# Patient Record
Sex: FEMALE | Race: WHITE | Employment: OTHER | URBAN - METROPOLITAN AREA
[De-identification: names, ages, dates, MRNs, and addresses within clinical notes are randomized per-mention and may not be internally consistent; named-entity substitution may affect disease eponyms.]

---

## 2017-01-20 PROBLEM — R03.0 ELEVATED BP WITHOUT DIAGNOSIS OF HYPERTENSION: Status: ACTIVE | Noted: 2017-01-20

## 2017-02-03 PROCEDURE — 86038 ANTINUCLEAR ANTIBODIES: CPT | Performed by: INTERNAL MEDICINE

## 2017-02-03 PROCEDURE — 87340 HEPATITIS B SURFACE AG IA: CPT | Performed by: INTERNAL MEDICINE

## 2017-02-03 PROCEDURE — 86803 HEPATITIS C AB TEST: CPT | Performed by: INTERNAL MEDICINE

## 2017-02-03 PROCEDURE — 82103 ALPHA-1-ANTITRYPSIN TOTAL: CPT | Performed by: INTERNAL MEDICINE

## 2017-02-03 PROCEDURE — 82390 ASSAY OF CERULOPLASMIN: CPT | Performed by: INTERNAL MEDICINE

## 2017-02-03 PROCEDURE — 84432 ASSAY OF THYROGLOBULIN: CPT | Performed by: INTERNAL MEDICINE

## 2017-02-10 PROCEDURE — 88305 TISSUE EXAM BY PATHOLOGIST: CPT | Performed by: INTERNAL MEDICINE

## 2017-04-28 ENCOUNTER — HOSPITAL ENCOUNTER (OUTPATIENT)
Age: 72
Discharge: HOME | End: 2017-04-28
Attending: EMERGENCY MEDICINE
Payer: MEDICARE

## 2017-04-28 VITALS
TEMPERATURE: 98 F | WEIGHT: 180 LBS | HEIGHT: 63 IN | OXYGEN SATURATION: 97 % | SYSTOLIC BLOOD PRESSURE: 145 MMHG | HEART RATE: 79 BPM | RESPIRATION RATE: 18 BRPM | BODY MASS INDEX: 31.89 KG/M2 | DIASTOLIC BLOOD PRESSURE: 76 MMHG

## 2017-04-28 DIAGNOSIS — N30.01 ACUTE CYSTITIS WITH HEMATURIA: Primary | ICD-10-CM

## 2017-04-28 PROCEDURE — 99214 OFFICE O/P EST MOD 30 MIN: CPT

## 2017-04-28 PROCEDURE — 87086 URINE CULTURE/COLONY COUNT: CPT | Performed by: EMERGENCY MEDICINE

## 2017-04-28 PROCEDURE — 81002 URINALYSIS NONAUTO W/O SCOPE: CPT

## 2017-04-28 PROCEDURE — 87186 SC STD MICRODIL/AGAR DIL: CPT | Performed by: EMERGENCY MEDICINE

## 2017-04-28 PROCEDURE — 87088 URINE BACTERIA CULTURE: CPT | Performed by: EMERGENCY MEDICINE

## 2017-04-28 RX ORDER — SULFAMETHOXAZOLE AND TRIMETHOPRIM 800; 160 MG/1; MG/1
1 TABLET ORAL 2 TIMES DAILY
Qty: 14 TABLET | Refills: 0 | Status: SHIPPED | OUTPATIENT
Start: 2017-04-28 | End: 2017-05-05

## 2017-04-28 NOTE — ED PROVIDER NOTES
Patient Seen in: [address] [facility]    History   Patient presents with:  Urinary Symptoms (urologic)    Stated Complaint: POSSIBLE UTI, PRESSURE    HPI    Patient is an 78-year-old female with a history of GERD and osteoarthritis • Thyroid disease Neg          Smoking Status: Never Smoker                      Smokeless Status: Never Used                        Comment: caffeine 2     Alcohol Use: No                Review of Systems    Positive for stated complaint: POSSIBLE UTI, tablet  Take 1 tablet by mouth 2 (two) times daily.   Qty: 14 tablet Refills: 0

## 2017-04-28 NOTE — ED INITIAL ASSESSMENT (HPI)
Pt reporting urinary urgency and frequency starting yesterday but worse this morning. Pt reporting dysuria and mild lower back discomfort. Denies fever. No N/V/D.

## 2017-04-30 ENCOUNTER — HOSPITAL ENCOUNTER (OUTPATIENT)
Age: 72
Discharge: HOME | End: 2017-04-30
Attending: EMERGENCY MEDICINE
Payer: MEDICARE

## 2017-04-30 VITALS
HEART RATE: 78 BPM | OXYGEN SATURATION: 97 % | WEIGHT: 180 LBS | HEIGHT: 63 IN | BODY MASS INDEX: 31.89 KG/M2 | SYSTOLIC BLOOD PRESSURE: 135 MMHG | TEMPERATURE: 98 F | DIASTOLIC BLOOD PRESSURE: 68 MMHG | RESPIRATION RATE: 16 BRPM

## 2017-04-30 DIAGNOSIS — T78.40XA ALLERGIC REACTION, INITIAL ENCOUNTER: Primary | ICD-10-CM

## 2017-04-30 PROCEDURE — 99214 OFFICE O/P EST MOD 30 MIN: CPT

## 2017-04-30 PROCEDURE — 99213 OFFICE O/P EST LOW 20 MIN: CPT

## 2017-04-30 RX ORDER — NITROFURANTOIN 25; 75 MG/1; MG/1
100 CAPSULE ORAL 2 TIMES DAILY
Qty: 14 CAPSULE | Refills: 0 | Status: SHIPPED | OUTPATIENT
Start: 2017-04-30 | End: 2017-05-07

## 2017-04-30 NOTE — ED PROVIDER NOTES
Patient Seen in: [facility]    History   Patient presents with:   Allergic Rxn Allergies (immune)    Stated Complaint: Rxn to Bactrim; Rash    HPI    Patient is a 52-year-old female with past history of gastroesophageal re Dawit Munira Mother    • Cancer Maternal Grandmother      colon cancer   • Other [OTHER] Other      muscular dystrophy 2  genetic   • Breast Cancer Neg    • Thyroid disease Neg          Smoking Status: Never Smoker                      Smokeless St Clinical Impression:  Allergic reaction, initial encounter  (primary encounter diagnosis)    Disposition:  Discharge    Follow-up:  Crystal Rodríguez MD  [address] [facility]  [phone]    In 3 days  If symptoms worsen      Medications Presc

## 2017-04-30 NOTE — ED INITIAL ASSESSMENT (HPI)
Rash to chin back and arms this am, was on bactrim for uti started 2 days, denies tongue swelling, no sob, uti symptoms better

## 2017-11-30 ENCOUNTER — HOSPITAL ENCOUNTER (OUTPATIENT)
Age: 72
Discharge: HOME | End: 2017-11-30
Attending: PEDIATRICS
Payer: MEDICARE

## 2017-11-30 VITALS
TEMPERATURE: 98 F | DIASTOLIC BLOOD PRESSURE: 66 MMHG | RESPIRATION RATE: 20 BRPM | HEIGHT: 63 IN | WEIGHT: 180 LBS | SYSTOLIC BLOOD PRESSURE: 150 MMHG | OXYGEN SATURATION: 98 % | HEART RATE: 77 BPM | BODY MASS INDEX: 31.89 KG/M2

## 2017-11-30 DIAGNOSIS — J01.90 ACUTE SINUSITIS, RECURRENCE NOT SPECIFIED, UNSPECIFIED LOCATION: Primary | ICD-10-CM

## 2017-11-30 PROCEDURE — 99213 OFFICE O/P EST LOW 20 MIN: CPT

## 2017-11-30 PROCEDURE — 99214 OFFICE O/P EST MOD 30 MIN: CPT

## 2017-11-30 RX ORDER — DOXYCYCLINE HYCLATE 100 MG/1
100 CAPSULE ORAL 2 TIMES DAILY
Qty: 20 CAPSULE | Refills: 0 | Status: SHIPPED | OUTPATIENT
Start: 2017-11-30 | End: 2017-12-10

## 2017-11-30 NOTE — ED PROVIDER NOTES
Patient presents with:  Cough/URI      HPI:     Carlota Davis is a 71year old female who presents for evaluation of a chief complaint of upper respiratory symptoms for about 8-9 days.   Patient states she was at a party with a bunch of kids and thinks she today.    Follow Up with:  Jerman Austin MD  [address]  [phone]      As needed

## 2017-11-30 NOTE — ED INITIAL ASSESSMENT (HPI)
PATIENT REPORTS 1 WEEK HISTORY OF SINUS PAIN AND PRESSURE. STATES SHE THINKS SHE HAS A SINUS INFECTION. DENIES COUGH. DENIES FEVERS.

## 2018-01-22 PROBLEM — K75.81 NASH (NONALCOHOLIC STEATOHEPATITIS): Status: ACTIVE | Noted: 2018-01-22

## 2018-02-23 PROBLEM — I71.20 THORACIC AORTIC ANEURYSM WITHOUT RUPTURE (HCC): Status: ACTIVE | Noted: 2018-02-23

## 2018-02-23 PROBLEM — I71.2 THORACIC AORTIC ANEURYSM WITHOUT RUPTURE (HCC): Status: ACTIVE | Noted: 2018-02-23

## 2018-02-23 PROBLEM — I71.20 THORACIC AORTIC ANEURYSM WITHOUT RUPTURE: Status: ACTIVE | Noted: 2018-02-23

## 2018-06-11 ENCOUNTER — HOSPITAL ENCOUNTER (OUTPATIENT)
Age: 73
Discharge: HOME | End: 2018-06-11
Attending: EMERGENCY MEDICINE
Payer: MEDICARE

## 2018-06-11 VITALS
HEART RATE: 71 BPM | HEIGHT: 63 IN | DIASTOLIC BLOOD PRESSURE: 73 MMHG | OXYGEN SATURATION: 97 % | WEIGHT: 185 LBS | SYSTOLIC BLOOD PRESSURE: 147 MMHG | TEMPERATURE: 99 F | RESPIRATION RATE: 20 BRPM | BODY MASS INDEX: 32.78 KG/M2

## 2018-06-11 DIAGNOSIS — J01.41 ACUTE RECURRENT PANSINUSITIS: Primary | ICD-10-CM

## 2018-06-11 PROCEDURE — 99214 OFFICE O/P EST MOD 30 MIN: CPT

## 2018-06-11 PROCEDURE — 99213 OFFICE O/P EST LOW 20 MIN: CPT

## 2018-06-11 RX ORDER — DOXYCYCLINE HYCLATE 100 MG/1
100 CAPSULE ORAL 2 TIMES DAILY
Qty: 20 CAPSULE | Refills: 0 | Status: SHIPPED | OUTPATIENT
Start: 2018-06-11 | End: 2018-06-21

## 2018-06-11 NOTE — ED PROVIDER NOTES
Patient Seen in: 5 [facility]    History   Patient presents with:  Cough/URI    Stated Complaint: SINUS    HPI    The patient is a 67-year-old female with past history of GERD, sinusitis in the past, most recently 11/2017 w Vitals [06/11/18 1049]  BP: 147/73  Pulse: 71  Resp: 20  Temp: 98.5 °F (36.9 °C)  Temp src: Oral  SpO2: 97 %  O2 Device: n/a    Current:/73   Pulse 71   Temp 98.5 °F (36.9 °C) (Oral)   Resp 20   Ht 160 cm (5' 3\")   Wt 83.9 kg   SpO2 97%   BMI 32.77

## 2019-02-17 ENCOUNTER — HOSPITAL ENCOUNTER (OUTPATIENT)
Age: 74
Discharge: HOME | End: 2019-02-17
Attending: EMERGENCY MEDICINE
Payer: MEDICARE

## 2019-02-17 ENCOUNTER — APPOINTMENT (OUTPATIENT)
Dept: GENERAL RADIOLOGY | Age: 74
End: 2019-02-17
Attending: EMERGENCY MEDICINE
Payer: MEDICARE

## 2019-02-17 VITALS
TEMPERATURE: 98 F | SYSTOLIC BLOOD PRESSURE: 153 MMHG | OXYGEN SATURATION: 99 % | HEIGHT: 63 IN | DIASTOLIC BLOOD PRESSURE: 65 MMHG | RESPIRATION RATE: 16 BRPM | HEART RATE: 71 BPM | BODY MASS INDEX: 32.77 KG/M2 | WEIGHT: 184.94 LBS

## 2019-02-17 DIAGNOSIS — M79.601 PAIN OF RIGHT UPPER EXTREMITY: Primary | ICD-10-CM

## 2019-02-17 PROCEDURE — 72072 X-RAY EXAM THORAC SPINE 3VWS: CPT | Performed by: EMERGENCY MEDICINE

## 2019-02-17 PROCEDURE — 99214 OFFICE O/P EST MOD 30 MIN: CPT

## 2019-02-17 PROCEDURE — 73060 X-RAY EXAM OF HUMERUS: CPT | Performed by: EMERGENCY MEDICINE

## 2019-02-17 RX ORDER — ACETAMINOPHEN 325 MG/1
650 TABLET ORAL ONCE
Status: COMPLETED | OUTPATIENT
Start: 2019-02-17 | End: 2019-02-17

## 2019-02-17 NOTE — ED PROVIDER NOTES
Patient Seen in: 5 [facility]    History   Patient presents with:  Fall (musculoskeletal, neurologic)    Stated Complaint: Fall, upper arm pain    HPI    Patient states she fell today falling against her right upper arm on 153/65   Pulse 71   Resp 16   Temp 97.7 °F (36.5 °C)   Temp src Oral   SpO2 99 %   O2 Device None (Room air)       Current:/65   Pulse 71   Temp 97.7 °F (36.5 °C) (Oral)   Resp 16   Ht 160 cm (5' 3\")   Wt 83.9 kg   SpO2 99%   BMI 32.77 kg/m² over the right upper quadrant, likely from prior cholecystectomy. CONCLUSION:  1. No radiographically visible acute osseous injury of the thoracic spine. 2. Mild scoliosis and multilevel degenerative changes thoracic spine.      Dictated by (CST):

## 2019-02-17 NOTE — ED INITIAL ASSESSMENT (HPI)
PATIENT REPORTS FALL 1 HOUR PRIOR TO ARRIVAL. STATES SHE SLIPPED ON ICE AND FELL ONTO HER RIGHT SIDE AND IS NOW C/O RIGHT UPPER ARM PAIN. DENIES PAIN TO RIGHT SHOULDER RIGHT, FOREARM, WRIST AND/OR RIGHT LOWER EXTREMITY.

## 2019-03-12 PROCEDURE — 88344 IMHCHEM/IMCYTCHM EA MLT ANTB: CPT | Performed by: RADIOLOGY

## 2019-03-12 PROCEDURE — 88360 TUMOR IMMUNOHISTOCHEM/MANUAL: CPT | Performed by: RADIOLOGY

## 2019-03-12 PROCEDURE — 88342 IMHCHEM/IMCYTCHM 1ST ANTB: CPT | Performed by: RADIOLOGY

## 2019-03-12 PROCEDURE — 88305 TISSUE EXAM BY PATHOLOGIST: CPT | Performed by: RADIOLOGY

## 2019-03-18 PROBLEM — C50.412 MALIGNANT NEOPLASM OF UPPER-OUTER QUADRANT OF LEFT BREAST IN FEMALE, ESTROGEN RECEPTOR POSITIVE  (HCC): Status: ACTIVE | Noted: 2019-03-18

## 2019-03-18 PROBLEM — C50.412 MALIGNANT NEOPLASM OF UPPER-OUTER QUADRANT OF LEFT BREAST IN FEMALE, ESTROGEN RECEPTOR POSITIVE (HCC): Status: ACTIVE | Noted: 2019-03-18

## 2019-03-18 PROBLEM — C50.412 MALIGNANT NEOPLASM OF UPPER-OUTER QUADRANT OF LEFT BREAST IN FEMALE, ESTROGEN RECEPTOR POSITIVE: Status: ACTIVE | Noted: 2019-03-18

## 2019-03-18 PROBLEM — Z17.0 MALIGNANT NEOPLASM OF UPPER-OUTER QUADRANT OF LEFT BREAST IN FEMALE, ESTROGEN RECEPTOR POSITIVE (HCC): Status: ACTIVE | Noted: 2019-03-18

## 2019-03-18 PROBLEM — Z17.0 MALIGNANT NEOPLASM OF UPPER-OUTER QUADRANT OF LEFT BREAST IN FEMALE, ESTROGEN RECEPTOR POSITIVE  (HCC): Status: ACTIVE | Noted: 2019-03-18

## 2019-03-18 PROBLEM — Z17.0 MALIGNANT NEOPLASM OF UPPER-OUTER QUADRANT OF LEFT BREAST IN FEMALE, ESTROGEN RECEPTOR POSITIVE: Status: ACTIVE | Noted: 2019-03-18

## 2019-03-29 PROCEDURE — 88360 TUMOR IMMUNOHISTOCHEM/MANUAL: CPT | Performed by: RADIOLOGY

## 2019-03-29 PROCEDURE — 88342 IMHCHEM/IMCYTCHM 1ST ANTB: CPT | Performed by: RADIOLOGY

## 2019-03-29 PROCEDURE — 88377 M/PHMTRC ALYS ISHQUANT/SEMIQ: CPT | Performed by: RADIOLOGY

## 2019-03-29 PROCEDURE — 88305 TISSUE EXAM BY PATHOLOGIST: CPT | Performed by: RADIOLOGY

## 2019-03-29 PROCEDURE — 88341 IMHCHEM/IMCYTCHM EA ADD ANTB: CPT | Performed by: RADIOLOGY

## 2019-04-01 PROBLEM — C50.912 INFLAMMATORY BREAST CANCER, LEFT (HCC): Status: ACTIVE | Noted: 2019-04-01

## 2019-04-11 PROCEDURE — 88305 TISSUE EXAM BY PATHOLOGIST: CPT | Performed by: RADIOLOGY

## 2019-05-31 PROBLEM — Z17.31 HER2-POSITIVE CARCINOMA OF BREAST (HCC): Status: ACTIVE | Noted: 2019-05-31

## 2019-05-31 PROBLEM — C50.919 HER2-POSITIVE CARCINOMA OF BREAST (HCC): Status: ACTIVE | Noted: 2019-05-31

## 2019-06-26 ENCOUNTER — HOSPITAL ENCOUNTER (INPATIENT)
Facility: HOSPITAL | Age: 74
LOS: 16 days | Discharge: NURSING, REHAB OR LONG-TERM CARE | DRG: 871 | End: 2019-07-12
Attending: EMERGENCY MEDICINE | Admitting: HOSPITALIST
Payer: MEDICARE

## 2019-06-26 ENCOUNTER — APPOINTMENT (OUTPATIENT)
Dept: GENERAL RADIOLOGY | Facility: HOSPITAL | Age: 74
DRG: 871 | End: 2019-06-26
Attending: EMERGENCY MEDICINE
Payer: MEDICARE

## 2019-06-26 DIAGNOSIS — A41.9 SEPSIS, DUE TO UNSPECIFIED ORGANISM: Primary | ICD-10-CM

## 2019-06-26 DIAGNOSIS — D70.9 NEUTROPENIC FEVER (HCC): ICD-10-CM

## 2019-06-26 DIAGNOSIS — R50.81 NEUTROPENIC FEVER (HCC): ICD-10-CM

## 2019-06-26 DIAGNOSIS — J18.9 PNEUMONIA OF LEFT LOWER LOBE DUE TO INFECTIOUS ORGANISM: ICD-10-CM

## 2019-06-26 PROBLEM — R50.9 FEVER: Status: ACTIVE | Noted: 2019-06-26

## 2019-06-26 LAB
BILIRUB UR QL: NEGATIVE
CLARITY UR: CLEAR
COLOR UR: YELLOW
GLUCOSE UR-MCNC: NEGATIVE MG/DL
KETONES UR-MCNC: NEGATIVE MG/DL
LACTATE SERPL-SCNC: 2 MMOL/L (ref 0.4–2)
LEUKOCYTE ESTERASE UR QL STRIP.AUTO: NEGATIVE
NITRITE UR QL STRIP.AUTO: NEGATIVE
PH UR: 6 [PH] (ref 5–8)
PROT UR-MCNC: NEGATIVE MG/DL
RBC #/AREA URNS AUTO: 2 /HPF
SP GR UR STRIP: 1.01 (ref 1–1.03)
UROBILINOGEN UR STRIP-ACNC: <2
VIT C UR-MCNC: NEGATIVE MG/DL
WBC #/AREA URNS AUTO: 1 /HPF

## 2019-06-26 PROCEDURE — 71046 X-RAY EXAM CHEST 2 VIEWS: CPT | Performed by: EMERGENCY MEDICINE

## 2019-06-26 RX ORDER — SODIUM CHLORIDE 0.9 % (FLUSH) 0.9 %
3 SYRINGE (ML) INJECTION AS NEEDED
Status: DISCONTINUED | OUTPATIENT
Start: 2019-06-26 | End: 2019-07-12

## 2019-06-26 RX ORDER — ONDANSETRON 2 MG/ML
4 INJECTION INTRAMUSCULAR; INTRAVENOUS EVERY 6 HOURS PRN
Status: DISCONTINUED | OUTPATIENT
Start: 2019-06-26 | End: 2019-07-12

## 2019-06-26 RX ORDER — ACETAMINOPHEN 500 MG
1000 TABLET ORAL ONCE
Status: COMPLETED | OUTPATIENT
Start: 2019-06-26 | End: 2019-06-26

## 2019-06-26 RX ORDER — METOCLOPRAMIDE HYDROCHLORIDE 5 MG/ML
10 INJECTION INTRAMUSCULAR; INTRAVENOUS EVERY 8 HOURS PRN
Status: DISCONTINUED | OUTPATIENT
Start: 2019-06-26 | End: 2019-07-02

## 2019-06-26 RX ORDER — PANTOPRAZOLE SODIUM 40 MG/1
40 TABLET, DELAYED RELEASE ORAL
Status: DISCONTINUED | OUTPATIENT
Start: 2019-06-27 | End: 2019-07-12

## 2019-06-26 RX ORDER — METOPROLOL SUCCINATE 25 MG/1
25 TABLET, EXTENDED RELEASE ORAL NIGHTLY
Status: DISCONTINUED | OUTPATIENT
Start: 2019-06-26 | End: 2019-07-12

## 2019-06-26 RX ORDER — SODIUM CHLORIDE 9 MG/ML
INJECTION, SOLUTION INTRAVENOUS CONTINUOUS
Status: DISCONTINUED | OUTPATIENT
Start: 2019-06-26 | End: 2019-06-28

## 2019-06-26 RX ORDER — ENOXAPARIN SODIUM 100 MG/ML
40 INJECTION SUBCUTANEOUS DAILY
Status: DISCONTINUED | OUTPATIENT
Start: 2019-06-27 | End: 2019-07-01

## 2019-06-26 RX ORDER — FLUTICASONE PROPIONATE 50 MCG
1 SPRAY, SUSPENSION (ML) NASAL DAILY
Status: DISCONTINUED | OUTPATIENT
Start: 2019-06-27 | End: 2019-07-05

## 2019-06-26 RX ORDER — ACETAMINOPHEN 325 MG/1
650 TABLET ORAL EVERY 6 HOURS PRN
Status: DISCONTINUED | OUTPATIENT
Start: 2019-06-26 | End: 2019-07-12

## 2019-06-27 ENCOUNTER — APPOINTMENT (OUTPATIENT)
Dept: GENERAL RADIOLOGY | Facility: HOSPITAL | Age: 74
DRG: 871 | End: 2019-06-27
Attending: INTERNAL MEDICINE
Payer: MEDICARE

## 2019-06-27 LAB
ADENOVIRUS F 40/41 PCR: NEGATIVE
ADENOVIRUS PCR:: NEGATIVE
ALBUMIN SERPL-MCNC: 2.8 G/DL (ref 3.4–5)
ALP LIVER SERPL-CCNC: 48 U/L (ref 55–142)
ALT SERPL-CCNC: 46 U/L (ref 13–56)
ANION GAP SERPL CALC-SCNC: 13 MMOL/L (ref 0–18)
AST SERPL-CCNC: 61 U/L (ref 15–37)
ASTROVIRUS PCR: NEGATIVE
B PERT DNA SPEC QL NAA+PROBE: NEGATIVE
BASOPHILS # BLD: 0.03 X10(3) UL (ref 0–0.2)
BASOPHILS NFR BLD: 2 %
BILIRUB DIRECT SERPL-MCNC: 0.2 MG/DL (ref 0–0.2)
BILIRUB SERPL-MCNC: 0.7 MG/DL (ref 0.1–2)
BUN BLD-MCNC: 9 MG/DL (ref 7–18)
BUN/CREAT SERPL: 10.7 (ref 10–20)
C CAYETANENSIS DNA SPEC QL NAA+PROBE: NEGATIVE
C PNEUM DNA SPEC QL NAA+PROBE: NEGATIVE
C. DIFFICILE TOXIN A/B PCR: POSITIVE
CALCIUM BLD-MCNC: 8.6 MG/DL (ref 8.5–10.1)
CAMPY SP DNA.DIARRHEA STL QL NAA+PROBE: NEGATIVE
CHLORIDE SERPL-SCNC: 107 MMOL/L (ref 98–112)
CO2 SERPL-SCNC: 23 MMOL/L (ref 21–32)
CORONAVIRUS 229E PCR:: NEGATIVE
CORONAVIRUS HKU1 PCR:: NEGATIVE
CORONAVIRUS NL63 PCR:: NEGATIVE
CORONAVIRUS OC43 PCR:: NEGATIVE
CREAT BLD-MCNC: 0.84 MG/DL (ref 0.55–1.02)
CRYPTOSP DNA SPEC QL NAA+PROBE: NEGATIVE
DEPRECATED RDW RBC AUTO: 51.9 FL (ref 35.1–46.3)
EAEC PAA PLAS AGGR+AATA ST NAA+NON-PRB: NEGATIVE
EC STX1+STX2 + H7 FLIC SPEC NAA+PROBE: NEGATIVE
ENTAMOEBA HISTOLYTICA PCR: NEGATIVE
EOSINOPHIL # BLD: 0.02 X10(3) UL (ref 0–0.7)
EOSINOPHIL NFR BLD: 1 %
EPEC EAE GENE STL QL NAA+NON-PROBE: NEGATIVE
ERYTHROCYTE [DISTWIDTH] IN BLOOD BY AUTOMATED COUNT: 15.9 % (ref 11–15)
ETEC LTA+ST1A+ST1B TOX ST NAA+NON-PROBE: NEGATIVE
FLUAV RNA SPEC QL NAA+PROBE: NEGATIVE
FLUBV RNA SPEC QL NAA+PROBE: NEGATIVE
GIARDIA LAMBLIA PCR: NEGATIVE
GLUCOSE BLD-MCNC: 138 MG/DL (ref 70–99)
HCT VFR BLD AUTO: 29.7 % (ref 35–48)
HGB BLD-MCNC: 10.2 G/DL (ref 12–16)
LACTATE SERPL-SCNC: 1.1 MMOL/L (ref 0.4–2)
LACTATE SERPL-SCNC: 4.9 MMOL/L (ref 0.4–2)
LYMPHOCYTES NFR BLD: 0.54 X10(3) UL (ref 1–4)
LYMPHOCYTES NFR BLD: 32 %
M PROTEIN MFR SERPL ELPH: 5.6 G/DL (ref 6.4–8.2)
MCH RBC QN AUTO: 30.6 PG (ref 26–34)
MCHC RBC AUTO-ENTMCNC: 34.3 G/DL (ref 31–37)
MCV RBC AUTO: 89.2 FL (ref 80–100)
METAPNEUMOVIRUS PCR:: NEGATIVE
MONOCYTES # BLD: 0.51 X10(3) UL (ref 0.1–1)
MONOCYTES NFR BLD: 30 %
MORPHOLOGY: NORMAL
MYCOPLASMA PNEUMONIA PCR:: NEGATIVE
NEUTROPHILS # BLD AUTO: 0.53 X10 (3) UL (ref 1.5–7.7)
NEUTROPHILS NFR BLD: 32 %
NEUTS BAND NFR BLD: 3 %
NEUTS HYPERSEG # BLD: 0.6 X10(3) UL (ref 1.5–7.7)
NOROVIRUS GI/GII PCR: NEGATIVE
OSMOLALITY SERPL CALC.SUM OF ELEC: 297 MOSM/KG (ref 275–295)
P SHIGELLOIDES DNA STL QL NAA+PROBE: NEGATIVE
PARAINFLUENZA 1 PCR:: NEGATIVE
PARAINFLUENZA 2 PCR:: NEGATIVE
PARAINFLUENZA 3 PCR:: NEGATIVE
PARAINFLUENZA 4 PCR:: NEGATIVE
PLATELET # BLD AUTO: 286 10(3)UL (ref 150–450)
PLATELET MORPHOLOGY: NORMAL
POTASSIUM SERPL-SCNC: 3.3 MMOL/L (ref 3.5–5.1)
RBC # BLD AUTO: 3.33 X10(6)UL (ref 3.8–5.3)
RHINOVIRUS/ENTERO PCR:: NEGATIVE
ROTAVIRUS A PCR: NEGATIVE
RSV RNA SPEC QL NAA+PROBE: NEGATIVE
SALMONELLA DNA SPEC QL NAA+PROBE: NEGATIVE
SAPOVIRUS PCR: NEGATIVE
SHIGELLA SP+EIEC IPAH ST NAA+NON-PROBE: NEGATIVE
SODIUM SERPL-SCNC: 143 MMOL/L (ref 136–145)
TOTAL CELLS COUNTED: 100
V CHOLERAE DNA SPEC QL NAA+PROBE: NEGATIVE
VIBRIO DNA SPEC NAA+PROBE: NEGATIVE
WBC # BLD AUTO: 1.7 X10(3) UL (ref 4–11)
YERSINIA DNA SPEC NAA+PROBE: NEGATIVE

## 2019-06-27 PROCEDURE — 71045 X-RAY EXAM CHEST 1 VIEW: CPT | Performed by: INTERNAL MEDICINE

## 2019-06-27 RX ORDER — LORAZEPAM 0.5 MG/1
0.5 TABLET ORAL NIGHTLY PRN
Status: DISCONTINUED | OUTPATIENT
Start: 2019-06-27 | End: 2019-06-28

## 2019-06-27 RX ORDER — POTASSIUM CHLORIDE 29.8 MG/ML
40 INJECTION INTRAVENOUS ONCE
Status: COMPLETED | OUTPATIENT
Start: 2019-06-27 | End: 2019-06-27

## 2019-06-27 RX ORDER — IPRATROPIUM BROMIDE AND ALBUTEROL SULFATE 2.5; .5 MG/3ML; MG/3ML
3 SOLUTION RESPIRATORY (INHALATION) EVERY 4 HOURS PRN
Status: DISCONTINUED | OUTPATIENT
Start: 2019-06-27 | End: 2019-07-12

## 2019-06-27 NOTE — PLAN OF CARE
Problem: Patient Centered Care  Goal: Patient preferences are identified and integrated in the patient's plan of care  Description  Interventions:  - What would you like us to know as we care for you? I have periods of incontinence.    - Provide timely, c injury  Description  INTERVENTIONS:  - Assess pt frequently for physical needs  - Identify cognitive and physical deficits and behaviors that affect risk of falls.   - [place] fall precautions as indicated by assessment.  - Educate pt/family on patient sa

## 2019-06-27 NOTE — PLAN OF CARE
Problem: Patient Centered Care  Goal: Patient preferences are identified and integrated in the patient's plan of care  Description  Interventions:  - What would you like us to know as we care for you?   - Provide timely, complete, and accurate informatio strengthening/mobility  - Encourage toileting schedule  Outcome: Progressing

## 2019-06-27 NOTE — CONSULTS
Hematology/Oncology Consult Note        NAME: Nesha Perez - [facility]T - MRN: [id number] - Age: 68year old - : 1945    Reason for Consult:  Breast Cancer, Neutropenic Fever, pneumonia     Ryne Hao is a 68 y.o female well known to me for the m genetic   • Heart Disease Sister 72   • Colon Cancer Maternal Grandmother         colon cancer   • Cancer Maternal Grandmother         colon   • Breast Cancer Neg    • Thyroid disease Neg        SOCIAL HISTORY: Social History    Tobacco Use      Smoking st --  8.6   ALB 2.9* 2.8*   * 143   K 3.00* 3.3*   CL 95* 107   CO2 25.5 23.0   ALKPHO 51* 48*   AST 62* 61*   ALT 42 46   BILT 0.77 0.7   TP 6.2 5.6*       Imaging:   Reviewed in chart     Assessment/Plan:  Twyla Montoya is a 68 y.o female with anshul

## 2019-06-27 NOTE — PROGRESS NOTES
[facility] Dosing Service    Initial Pharmacokinetic Consult for Vancomycin Dosing     Twyla Montoya is a 68year old female who is being treated for pneumonia.   Pharmacy has been asked to dose Vancomycin by Dr. Dumont Child    She is allergic to adhesive tape

## 2019-06-27 NOTE — PROGRESS NOTES
[facility]      Sepsis Reassessment Note    /76 (BP Location: Left arm)   Pulse 103   Temp 100.4 °F (38 °C) (Temporal)   Resp 26   Ht 5' 3\" (1.6 m)   Wt 185 lb 11.2 oz (84.2 kg)   SpO2 100%   BMI 32.90 kg/m²      8:29 AM    Cardiac

## 2019-06-27 NOTE — PROGRESS NOTES
[facility] Note:  Renal Dose Adjustment    Cefepime (Maxipime) dosing had been previously adjusted by pharmacy due to renal insufficiency for Lachelle Gonzalez. Estimated Creatinine Clearance: 50.1 mL/min (based on SCr of 0.84 mg/dL).     Due to improved

## 2019-06-27 NOTE — ED PROVIDER NOTES
Patient Seen in: [facility] Emergency Department    History   Patient presents with:  Fever    Stated Complaint: Neutropenic fever, r/o PNA from office    HPI    80-year-old female with history of Breast cancer, last chemotherapy 9 days ago, here No      Alcohol/week: 0.0 oz    Drug use: No      Review of Systems   Constitutional: Positive for fever. Negative for appetite change and fatigue. HENT: Negative for congestion, ear pain and sore throat.     Eyes: Negative for pain, discharge and redness She exhibits no distension. There is no tenderness. There is no guarding. Musculoskeletal: Normal range of motion. She exhibits no tenderness. Neurological: She is alert and oriented to person, place, and time.    5/5 strength in b/l UEs and LEs, normal Neutrophils Absolute Manual 0.49 (LL) 1.30 - 6.70 10ˆ3/µL    Lymphocytes Absolute Manual 0.33 (L) 0.90 - 4.00 10ˆ3/µL    Monocytes Absolute Manual 0.59 0.10 - 0.60 10ˆ3/µL    Eosinophils Absolute 0.00 0.00 - 0.30 10ˆ3/µL    Basophils Absolute Manual 0.03 0 found neutropenic, anemia, hypokalemia, XR with pneumonia, lactic normal  - fluids ordered  - tylenol ordered  - k repleted  - vanc/cefepime ordered  - discussed with Dr. Bettina Chino - informed him of pt admission  - discussed with Dr. Esme Sandoval - informed her pleural effusion, neutropenia.     Critical Care Time:  Total critical care time for this patient was 45 minutes exclusive of separately billable procedures, but in obtaining history, performing a physical exam, bedside monitoring of interventions, collecti

## 2019-06-27 NOTE — H&P
[facility] Hospitalist H&P       CC: Patient presents with:  Fever       PCP: Miladys Mortensen MD    History of Present Illness: Patient is a 68year old female with PMH sig for HTN, GERD, and Breast Cancer HER2+ on neoadjuvant chemo, last dose 10 days ago, who p RASH     Home Medications:    Outpatient Medications Marked as Taking for the 6/26/19 encounter [facility] H[facility] Encounter):  diphenoxylate-atropine 2.5-0.025 MG Oral Tab Take 1-2 tablets by mouth 4 (four) times daily as needed for Diarrhea.  Disp: 30 tablet Rfl: Normocephalic    Eyes:  Sclera anicteric, No conjunctival pallor    Nose: Nares normal. Septum midline    Throat: Lips, mucosa, and tongue normal    Neck: Supple,    Lungs:   Rhonchi, wheezing    Chest wall:  No tenderness or deformity.    Heart:  Tachy, no fever from PNA  - treatment as noted above  - filgastrim given this am  - onc following    Acute hypoxic respiratory failure  - 2/2 to PNA  - on NRB  - wean O2 as able  - pulm consulted  - RVP ordered    Cdiff / diarrhea  - cdiff +  - oral vanco    Hypokal

## 2019-06-27 NOTE — SIGNIFICANT EVENT
RRT    *See RRT Documentation Record*    Reason the RRT was called: O2 in 60s, rigors  Assessment of patient leading up to RRT: pt walked to bathroom and said she had been having shakes for about 10 minutes. HR in 140s while walking back to bed.  Took vital

## 2019-06-27 NOTE — PLAN OF CARE
Problem: Patient Centered Care  Goal: Patient preferences are identified and integrated in the patient's plan of care  Description  Interventions:  - What would you like us to know as we care for you?   - Provide timely, complete, and accurate informatio Progressing    Pt resting in bed. Ambulates in room with assist. 2L O2 in place on admission, increased to 3L. Nonproductive cough. SOB with activity. IV fluid started. Remote tele in place. IV antibiotics and IV potassium given in ED.  Pt denies pain or na

## 2019-06-27 NOTE — CONSULTS
Critical Care Consult     Assessment / Plan:  1. Acute hypoxic respiratory failure - due to PNA  - on NRB  - vapotherm if she will tolerate  - abx as below  2. Severe sepsis  - IVFs  - lactic acid normalized  - abx  3.  HCAP  - vanc and cefepime  - follow-u 2016   •       1 no dm   • OTHER SURGICAL HISTORY      urethra polyp    • OTHER SURGICAL HISTORY      colposcopy ascus 2006 fibroids dx   • TONSILLECTOMY           Medications Prior to Admission:  diphenoxylate-atropine 2.5-0.025 MG Oral Tab Take 1 (eight) hours as needed for Nausea. Disp: 20 tablet Rfl: 3   LORazepam 0.5 MG Oral Tab Take 1 tablet (0.5 mg total) by mouth nightly as needed for Anxiety.  Disp: 10 tablet Rfl: 1   Pantoprazole Sodium 40 MG Oral Tab EC Take 1 tablet (40 mg total) by mouth

## 2019-06-27 NOTE — PROGRESS NOTES
Therapeutic interchange from Neupogen 300 mcg Sq once to Zarxio 300 mcg SQ once per P&T approved protocol.      Thank you,  Sharath Alberto, PharmD

## 2019-06-28 ENCOUNTER — APPOINTMENT (OUTPATIENT)
Dept: GENERAL RADIOLOGY | Facility: HOSPITAL | Age: 74
DRG: 871 | End: 2019-06-28
Attending: HOSPITALIST
Payer: MEDICARE

## 2019-06-28 LAB
ALBUMIN SERPL-MCNC: 2.5 G/DL (ref 3.4–5)
ALBUMIN/GLOB SERPL: 0.9 {RATIO} (ref 1–2)
ALP LIVER SERPL-CCNC: 43 U/L (ref 55–142)
ALT SERPL-CCNC: 42 U/L (ref 13–56)
ANION GAP SERPL CALC-SCNC: 9 MMOL/L (ref 0–18)
AST SERPL-CCNC: 70 U/L (ref 15–37)
BASOPHILS # BLD: 0 X10(3) UL (ref 0–0.2)
BASOPHILS NFR BLD: 0 %
BILIRUB SERPL-MCNC: 0.8 MG/DL (ref 0.1–2)
BUN BLD-MCNC: 6 MG/DL (ref 7–18)
BUN/CREAT SERPL: 10.3 (ref 10–20)
CALCIUM BLD-MCNC: 8.3 MG/DL (ref 8.5–10.1)
CHLORIDE SERPL-SCNC: 101 MMOL/L (ref 98–112)
CO2 SERPL-SCNC: 25 MMOL/L (ref 21–32)
CREAT BLD-MCNC: 0.58 MG/DL (ref 0.55–1.02)
DEPRECATED RDW RBC AUTO: 50.8 FL (ref 35.1–46.3)
EOSINOPHIL # BLD: 0 X10(3) UL (ref 0–0.7)
EOSINOPHIL NFR BLD: 0 %
ERYTHROCYTE [DISTWIDTH] IN BLOOD BY AUTOMATED COUNT: 15.9 % (ref 11–15)
GLOBULIN PLAS-MCNC: 2.7 G/DL (ref 2.8–4.4)
GLUCOSE BLD-MCNC: 84 MG/DL (ref 70–99)
HAV IGM SER QL: 1.2 MG/DL (ref 1.6–2.6)
HAV IGM SER QL: 2 MG/DL (ref 1.6–2.6)
HCT VFR BLD AUTO: 27.4 % (ref 35–48)
HGB BLD-MCNC: 9.5 G/DL (ref 12–16)
LYMPHOCYTES NFR BLD: 0.38 X10(3) UL (ref 1–4)
LYMPHOCYTES NFR BLD: 6 %
M PROTEIN MFR SERPL ELPH: 5.2 G/DL (ref 6.4–8.2)
MCH RBC QN AUTO: 30.4 PG (ref 26–34)
MCHC RBC AUTO-ENTMCNC: 34.7 G/DL (ref 31–37)
MCV RBC AUTO: 87.5 FL (ref 80–100)
METAMYELOCYTES # BLD: 0.13 X10(3) UL
METAMYELOCYTES NFR BLD: 2 %
MONOCYTES # BLD: 0.7 X10(3) UL (ref 0.1–1)
MONOCYTES NFR BLD: 11 %
MORPHOLOGY: NORMAL
NEUTROPHILS # BLD AUTO: 4.75 X10 (3) UL (ref 1.5–7.7)
NEUTROPHILS NFR BLD: 51 %
NEUTS BAND NFR BLD: 30 %
NEUTS HYPERSEG # BLD: 5.18 X10(3) UL (ref 1.5–7.7)
OSMOLALITY SERPL CALC.SUM OF ELEC: 277 MOSM/KG (ref 275–295)
PLATELET # BLD AUTO: 275 10(3)UL (ref 150–450)
PLATELET MORPHOLOGY: NORMAL
POTASSIUM SERPL-SCNC: 2.4 MMOL/L (ref 3.5–5.1)
POTASSIUM SERPL-SCNC: 2.7 MMOL/L (ref 3.5–5.1)
RBC # BLD AUTO: 3.13 X10(6)UL (ref 3.8–5.3)
SODIUM SERPL-SCNC: 135 MMOL/L (ref 136–145)
TOTAL CELLS COUNTED: 100
TOXIC GRANULES BLD QL SMEAR: PRESENT
WBC # BLD AUTO: 6.4 X10(3) UL (ref 4–11)

## 2019-06-28 PROCEDURE — 5A09357 ASSISTANCE WITH RESPIRATORY VENTILATION, LESS THAN 24 CONSECUTIVE HOURS, CONTINUOUS POSITIVE AIRWAY PRESSURE: ICD-10-PCS | Performed by: HOSPITALIST

## 2019-06-28 PROCEDURE — 71045 X-RAY EXAM CHEST 1 VIEW: CPT | Performed by: HOSPITALIST

## 2019-06-28 RX ORDER — POTASSIUM CHLORIDE 29.8 MG/ML
40 INJECTION INTRAVENOUS ONCE
Status: COMPLETED | OUTPATIENT
Start: 2019-06-28 | End: 2019-06-28

## 2019-06-28 RX ORDER — POTASSIUM CHLORIDE 29.8 MG/ML
40 INJECTION INTRAVENOUS ONCE
Status: COMPLETED | OUTPATIENT
Start: 2019-06-28 | End: 2019-06-29

## 2019-06-28 RX ORDER — POTASSIUM CHLORIDE 14.9 MG/ML
20 INJECTION INTRAVENOUS ONCE
Status: COMPLETED | OUTPATIENT
Start: 2019-06-28 | End: 2019-06-28

## 2019-06-28 RX ORDER — POTASSIUM CHLORIDE 29.8 MG/ML
INJECTION INTRAVENOUS
Status: COMPLETED
Start: 2019-06-28 | End: 2019-06-28

## 2019-06-28 RX ORDER — LORAZEPAM 0.5 MG/1
0.5 TABLET ORAL EVERY 8 HOURS PRN
Status: DISCONTINUED | OUTPATIENT
Start: 2019-06-28 | End: 2019-06-29

## 2019-06-28 RX ORDER — FUROSEMIDE 10 MG/ML
40 INJECTION INTRAMUSCULAR; INTRAVENOUS
Status: DISCONTINUED | OUTPATIENT
Start: 2019-06-28 | End: 2019-06-29

## 2019-06-28 RX ORDER — FUROSEMIDE 10 MG/ML
40 INJECTION INTRAMUSCULAR; INTRAVENOUS ONCE
Status: COMPLETED | OUTPATIENT
Start: 2019-06-28 | End: 2019-06-28

## 2019-06-28 RX ORDER — MAGNESIUM OXIDE 400 MG (241.3 MG MAGNESIUM) TABLET
800 TABLET ONCE
Status: DISCONTINUED | OUTPATIENT
Start: 2019-06-28 | End: 2019-06-28

## 2019-06-28 NOTE — CM/SW NOTE
6/28: BESS received MDO for completion of POLST. BESS met with son, Macey Hodges who is the [facility]. Document has been completed and placed in chart. MD to sign form.      He reports the patient lives home alone and has been independent up to time of hospitali

## 2019-06-28 NOTE — PROGRESS NOTES
Hematology/Oncology follow up Note      C.diff positive  Reports feeling slightly better     Past Medical History:   Diagnosis Date   • Breast cancer [facility])    • Breast injury 2016    left breast    • Essential hypertension    • Fibrocystic breast 2016    l Intravenous Once   • vancomycin  1,250 mg Intravenous Q24H   • cefepime  1 g Intravenous Q8H   • Vancomycin HCl  125 mg Oral Q6H   • azithromycin  500 mg Intravenous Q24H   • Fluticasone Propionate  1 spray Each Nare Daily   • Metoprolol Succinate ER  25 m currently on neoadjuvant chemotherapy  - no chemo while inpatient  - will change regimen to weekly taxol dosing for future treatment due to current intolerance     Neutropenic fever  - did not receive neulasta  - neupogen added in the setting of severe deloris

## 2019-06-28 NOTE — RESPIRATORY THERAPY NOTE
19:00 - came in for routine check on Airvo running at 40 L FiO2 80%, asked patient if Airvo was comfortable. Patient complained of noise from high flow, readjusted nasal cannula and lowered Airvo temperature from 37 degrees to 31 degrees Fahrenheit.     19:

## 2019-06-28 NOTE — PHYSICAL THERAPY NOTE
PT orders rvcd; pt is not appropriate for skilled therapy at this time, Fi02 85% saturating <21%; per rehab policy, hold intervention Fi02 >80%; will follow up at later time if appropriate.    Thank you,  Daxa Tineo, PT, DPT

## 2019-06-28 NOTE — OCCUPATIONAL THERAPY NOTE
OT orders rvcd; pt is not appropriate for skilled therapy at this time, Fi02 85% saturating <88%; per rehab policy, hold intervention Fi02 >80%; will follow up at later time if appropriate.      Fercho Sotelo, OTR/L   Occupational Therapy   Alcides Reddy

## 2019-06-28 NOTE — PROGRESS NOTES
Critical Care Progress Note     Assessment / Plan:  1. Acute hypoxic respiratory failure - due to PNA  - on vapotherm, wean as tolerated  - add bipap with sleep  - abx as below  - repeat CXR pending  - lasix 40mg IV once today  2.  Severe sepsis  - complete

## 2019-06-28 NOTE — PROGRESS NOTES
[facility] Hospitalist Progress Note     CC: Hospital Follow up    PCP: Daniel Gutierres MD       Assessment/Plan:     Principal Problem:    Sepsis, due to unspecified organism [facility])  Active Problems:    Fever    Neutropenic fever ([address]    Pneumonia of left lower l morning, still SOB with minimal movement, Diarrhea improved, no fevers, no nausea or vomiting    OBJECTIVE:    Blood pressure 114/68, pulse 115, temperature 99 °F (37.2 °C), temperature source Temporal, resp.  rate (!) 37, height 5' 3\" (1.6 m), weight 185 AST 62* 61* 70*   ALB 2.9* 2.8* 2.5*         Imaging:  Xr Chest Pa + Lat Chest (cpt=71046)    Result Date: 6/26/2019  CONCLUSION:  1. Left perihilar and basilar pneumonia. Follow-up to complete radiographic resolution recommended.  2. Prominence to the b

## 2019-06-28 NOTE — PLAN OF CARE
Pt alert. Reports no pain. No cough or SOB noted, but pt reports she feels more SOB. Lung sounds diminished BL. O2 sat 90-92% on Vapotherm. Spoke to Dr. Jessy Palomino, order received for duonebs q4h prn. Respiratory therapist notified. VSS.   No acute distress based on oxygen saturation or ABGs  - Provide Smoking Cessation handout, if applicable  - Encourage broncho-pulmonary hygiene including cough, deep breathe, Incentive Spirometry  - Assess the need for suctioning and perform as needed  - Assess and instruct

## 2019-06-28 NOTE — PLAN OF CARE
Problem: PAIN - ADULT  Goal: Verbalizes/displays adequate comfort level or patient's stated pain goal  Description  INTERVENTIONS:  - Encourage pt to monitor pain and request assistance  - Assess pain using appropriate pain scale  - Administer analgesics applicable  - Encourage broncho-pulmonary hygiene including cough, deep breathe, Incentive Spirometry  - Assess the need for suctioning and perform as needed  - Assess and instruct to report SOB or any respiratory difficulty  - Respiratory Therapy support

## 2019-06-28 NOTE — DIETARY NOTE
ADULT NUTRITION INITIAL ASSESSMENT    Pt is at moderate nutrition risk. Pt meets malnutrition criteria.       RECOMMENDATIONS TO MD:  None at this time     CRITERIA FOR MALNUTRITION DIAGNOSIS: Criteria for non-severe malnutrition diagnosis: chronic illness (ONS) -RD added High [place]/Protein Supplement Ensure Enlive TID. Use/benefits discussed.   - Vitamin and mineral supplements:   none  - Nutrition education: assess education needs  - Feeding assistance: meal set up  - Coordination of nutrition care: Mariluz Perez azithromycin  500 mg Intravenous Q24H   • Fluticasone Propionate  1 spray Each Nare Daily   • Metoprolol Succinate ER  25 mg Oral Nightly   • Pantoprazole Sodium  40 mg Oral BID AC   • enoxaparin  40 mg Subcutaneous Daily     LABS: reviewed K amd Mg being

## 2019-06-29 ENCOUNTER — APPOINTMENT (OUTPATIENT)
Dept: GENERAL RADIOLOGY | Facility: HOSPITAL | Age: 74
DRG: 871 | End: 2019-06-29
Attending: HOSPITALIST
Payer: MEDICARE

## 2019-06-29 LAB
ANION GAP SERPL CALC-SCNC: 6 MMOL/L (ref 0–18)
BASOPHILS # BLD: 0.11 X10(3) UL (ref 0–0.2)
BASOPHILS NFR BLD: 1 %
BUN BLD-MCNC: 13 MG/DL (ref 7–18)
BUN/CREAT SERPL: 19.4 (ref 10–20)
CALCIUM BLD-MCNC: 8.9 MG/DL (ref 8.5–10.1)
CHLORIDE SERPL-SCNC: 100 MMOL/L (ref 98–112)
CO2 SERPL-SCNC: 31 MMOL/L (ref 21–32)
CREAT BLD-MCNC: 0.67 MG/DL (ref 0.55–1.02)
DEPRECATED RDW RBC AUTO: 50.6 FL (ref 35.1–46.3)
EOSINOPHIL # BLD: 0 X10(3) UL (ref 0–0.7)
EOSINOPHIL NFR BLD: 0 %
ERYTHROCYTE [DISTWIDTH] IN BLOOD BY AUTOMATED COUNT: 16 % (ref 11–15)
GLUCOSE BLD-MCNC: 116 MG/DL (ref 70–99)
HAV IGM SER QL: 2 MG/DL (ref 1.6–2.6)
HCT VFR BLD AUTO: 27.2 % (ref 35–48)
HGB BLD-MCNC: 9.4 G/DL (ref 12–16)
LYMPHOCYTES NFR BLD: 0.56 X10(3) UL (ref 1–4)
LYMPHOCYTES NFR BLD: 5 %
MCH RBC QN AUTO: 30.1 PG (ref 26–34)
MCHC RBC AUTO-ENTMCNC: 34.6 G/DL (ref 31–37)
MCV RBC AUTO: 87.2 FL (ref 80–100)
MONOCYTES # BLD: 0.67 X10(3) UL (ref 0.1–1)
MONOCYTES NFR BLD: 6 %
MORPHOLOGY: NORMAL
NEUTROPHILS # BLD AUTO: 9.2 X10 (3) UL (ref 1.5–7.7)
NEUTROPHILS NFR BLD: 60 %
NEUTS BAND NFR BLD: 28 %
NEUTS HYPERSEG # BLD: 9.86 X10(3) UL (ref 1.5–7.7)
OSMOLALITY SERPL CALC.SUM OF ELEC: 285 MOSM/KG (ref 275–295)
PLATELET # BLD AUTO: 282 10(3)UL (ref 150–450)
PLATELET MORPHOLOGY: NORMAL
POTASSIUM SERPL-SCNC: 3.6 MMOL/L (ref 3.5–5.1)
RBC # BLD AUTO: 3.12 X10(6)UL (ref 3.8–5.3)
SODIUM SERPL-SCNC: 137 MMOL/L (ref 136–145)
TOTAL CELLS COUNTED: 100
TOXIC GRANULES BLD QL SMEAR: PRESENT
VANCOMYCIN TROUGH SERPL-MCNC: 3.7 UG/ML (ref 10–20)
WBC # BLD AUTO: 11.2 X10(3) UL (ref 4–11)

## 2019-06-29 PROCEDURE — 71045 X-RAY EXAM CHEST 1 VIEW: CPT | Performed by: HOSPITALIST

## 2019-06-29 RX ORDER — FUROSEMIDE 10 MG/ML
40 INJECTION INTRAMUSCULAR; INTRAVENOUS 3 TIMES DAILY
Status: DISCONTINUED | OUTPATIENT
Start: 2019-06-29 | End: 2019-07-01

## 2019-06-29 RX ORDER — METOLAZONE 2.5 MG/1
2.5 TABLET ORAL ONCE
Status: COMPLETED | OUTPATIENT
Start: 2019-06-29 | End: 2019-06-29

## 2019-06-29 RX ORDER — LORAZEPAM 0.5 MG/1
0.5 TABLET ORAL EVERY 6 HOURS PRN
Status: DISCONTINUED | OUTPATIENT
Start: 2019-06-29 | End: 2019-07-05

## 2019-06-29 RX ORDER — METRONIDAZOLE 500 MG/100ML
500 INJECTION, SOLUTION INTRAVENOUS EVERY 8 HOURS
Status: DISCONTINUED | OUTPATIENT
Start: 2019-06-29 | End: 2019-07-05

## 2019-06-29 RX ORDER — METHYLPREDNISOLONE SODIUM SUCCINATE 125 MG/2ML
60 INJECTION, POWDER, LYOPHILIZED, FOR SOLUTION INTRAMUSCULAR; INTRAVENOUS EVERY 8 HOURS
Status: DISCONTINUED | OUTPATIENT
Start: 2019-06-29 | End: 2019-07-03

## 2019-06-29 NOTE — PROGRESS NOTES
Writer went in to say hello after report and right away patient expressed that she wants High Flow and does not want Bipap on. Son Iam Praira at bedside states she only wants comfort. Patient denies any pain.  Offered comfort medication such as Morphine or Ativan

## 2019-06-29 NOTE — PROGRESS NOTES
[facility] AND CLINICS  Progress Note    Lachelle Gonzalez Patient Status:  Inpatient    1945 MRN [id number]   Location [facility] 2W/SW Attending Mason Hargrove MD   [facility] Day # 3 PCP Heidi Borjas MD     Subjective:    Same respiratory status   Contin 11.0 - 15.0 %    .0 150.0 - 450.0 10(3)uL    Neutrophil Absolute Prelim 9.20 (H) 1.50 - 7.70 x10 (3) uL   SCAN SLIDE    Collection Time: 06/29/19  5:34 AM   Result Value Ref Range    Slide Review Slide reviewed, manual differential added    MANUAL D Cancer  - HER2 positive inflammatory disease currently on neoadjuvant chemotherapy  - no chemo while inpatient  - will change regimen to weekly taxol dosing for future treatment due to current intolerance  No chemo while in-patient     Neutropenic fever  -

## 2019-06-29 NOTE — PLAN OF CARE
HemOnc [facility]): no change in current plan, no inpatient chemo   Pulmonary (Porcelli): IV Solumedrol Q8H   Hospitalist Christian Osman): repeat CXR, IV Flagyl, plan to aggressively diurese over the weekend - if no change, patient will transition to IP hospice per he Consider cultural and social influences on pain and pain management  - Manage/alleviate anxiety  - Utilize distraction and/or relaxation techniques  - Monitor for opioid side effects  - Notify MD/LIP if interventions unsuccessful or patient reports new genaro Encourage oral intake as appropriate  - Instruct patient on fluid and nutrition restrictions as appropriate  Outcome: Progressing     Problem: RESPIRATORY - ADULT  Goal: Achieves optimal ventilation and oxygenation  Description  INTERVENTIONS:  - Assess fo

## 2019-06-29 NOTE — PLAN OF CARE
Problem: PAIN - ADULT  Goal: Verbalizes/displays adequate comfort level or patient's stated pain goal  Description  INTERVENTIONS:  - Encourage pt to monitor pain and request assistance  - Assess pain using appropriate pain scale  - Administer analgesics b

## 2019-06-29 NOTE — OCCUPATIONAL THERAPY NOTE
Attempted OT evaluation - Discussed with LYLY Maldonado for therapy today - continues to require increased 02 support 95% on FiO2 and hospice on consult.   Will continue to follow up and see when appropriate

## 2019-06-29 NOTE — PROGRESS NOTES
[address] Patient Status:  Inpatient    1945 MRN [id number]   Location [facility] 2W/SW Attending Mason Hargrove MD   Hosp Day # 3 PCP Heidi Borjas MD     Critical Care Progress Note     S: No new events.   Remains hypox 11.2 06/29/2019    RBC 3.12 06/29/2019    HGB 9.4 06/29/2019    HCT 27.2 06/29/2019    MCV 87.2 06/29/2019    MCH 30.1 06/29/2019    MCHC 34.6 06/29/2019    RDW 16.0 06/29/2019    .0 06/29/2019     Lab Results   Component Value Date     06/29/

## 2019-06-29 NOTE — PROGRESS NOTES
[facility] Hospitalist Progress Note     CC: Hospital Follow up    PCP: Mary nAn Stewart MD       Assessment/Plan:     Principal Problem:    Sepsis, due to unspecified organism [facility])  Active Problems:    Fever    Neutropenic fever ([address]    Pneumonia of left lower l piv     Dispo: pending clinical course, maintain PCU status      Questions/concerns were discussed with patient and/or family by bedside.     Thank Julianna Timmons MD    [facility] Hospitalist     Subjective:      feeling worse this morning, still SOB with minimal CREATSERUM 0.84 0.58  --  0.67   GFRAA 80 106  --  102   GFRNAA 70 92  --  88   CA 8.6 8.3*  --  8.9    135*  --  137   K 3.3* 2.7* 2.4* 3.6    101  --  100   CO2 23.0 25.0  --  31.0       Recent Labs   Lab 06/26/19  1525 06/27/19  0333 06/28 Intravenous BID (Diuretic)   • fentaNYL citrate       • vancomycin  1,250 mg Intravenous Q24H   • cefepime  1 g Intravenous Q8H   • Vancomycin HCl  125 mg Oral Q6H   • azithromycin  500 mg Intravenous Q24H   • Fluticasone Propionate  1 spray Each Nare Patric

## 2019-06-29 NOTE — PLAN OF CARE
Problem: Patient Centered Care  Goal: Patient preferences are identified and integrated in the patient's plan of care  Description  Interventions:  - What would you like us to know as we care for you?   - Provide timely, complete, and accurate informatio Identify cognitive and physical deficits and behaviors that affect risk of falls.   - [place] fall precautions as indicated by assessment.  - Educate pt/family on patient safety including physical limitations  - Instruct pt to call for assistance with act

## 2019-06-30 LAB
ANION GAP SERPL CALC-SCNC: 8 MMOL/L (ref 0–18)
BASOPHILS # BLD: 0 X10(3) UL (ref 0–0.2)
BASOPHILS NFR BLD: 0 %
BUN BLD-MCNC: 21 MG/DL (ref 7–18)
BUN/CREAT SERPL: 22.6 (ref 10–20)
CALCIUM BLD-MCNC: 9.4 MG/DL (ref 8.5–10.1)
CHLORIDE SERPL-SCNC: 93 MMOL/L (ref 98–112)
CO2 SERPL-SCNC: 37 MMOL/L (ref 21–32)
CREAT BLD-MCNC: 0.93 MG/DL (ref 0.55–1.02)
DEPRECATED RDW RBC AUTO: 51.1 FL (ref 35.1–46.3)
EOSINOPHIL # BLD: 0 X10(3) UL (ref 0–0.7)
EOSINOPHIL NFR BLD: 0 %
ERYTHROCYTE [DISTWIDTH] IN BLOOD BY AUTOMATED COUNT: 15.9 % (ref 11–15)
GLUCOSE BLD-MCNC: 150 MG/DL (ref 70–99)
HAV IGM SER QL: 1.9 MG/DL (ref 1.6–2.6)
HCT VFR BLD AUTO: 30.5 % (ref 35–48)
HGB BLD-MCNC: 10.5 G/DL (ref 12–16)
LYMPHOCYTES NFR BLD: 0.15 X10(3) UL (ref 1–4)
LYMPHOCYTES NFR BLD: 1 %
MCH RBC QN AUTO: 30.3 PG (ref 26–34)
MCHC RBC AUTO-ENTMCNC: 34.4 G/DL (ref 31–37)
MCV RBC AUTO: 87.9 FL (ref 80–100)
MONOCYTES # BLD: 1.02 X10(3) UL (ref 0.1–1)
MONOCYTES NFR BLD: 7 %
NEUTROPHILS # BLD AUTO: 12.21 X10 (3) UL (ref 1.5–7.7)
NEUTROPHILS NFR BLD: 77 %
NEUTS BAND NFR BLD: 15 %
NEUTS HYPERSEG # BLD: 13.34 X10(3) UL (ref 1.5–7.7)
OSMOLALITY SERPL CALC.SUM OF ELEC: 292 MOSM/KG (ref 275–295)
PLATELET # BLD AUTO: 304 10(3)UL (ref 150–450)
PLATELET MORPHOLOGY: NORMAL
POTASSIUM SERPL-SCNC: 2.6 MMOL/L (ref 3.5–5.1)
POTASSIUM SERPL-SCNC: 2.9 MMOL/L (ref 3.5–5.1)
RBC # BLD AUTO: 3.47 X10(6)UL (ref 3.8–5.3)
SODIUM SERPL-SCNC: 138 MMOL/L (ref 136–145)
TOTAL CELLS COUNTED: 100
TOXIC GRANULES BLD QL SMEAR: PRESENT
WBC # BLD AUTO: 14.5 X10(3) UL (ref 4–11)

## 2019-06-30 RX ORDER — POTASSIUM CHLORIDE 20 MEQ/1
40 TABLET, EXTENDED RELEASE ORAL ONCE
Status: DISCONTINUED | OUTPATIENT
Start: 2019-06-30 | End: 2019-06-30

## 2019-06-30 RX ORDER — POTASSIUM CHLORIDE 1.5 G/1.77G
40 POWDER, FOR SOLUTION ORAL ONCE
Status: COMPLETED | OUTPATIENT
Start: 2019-06-30 | End: 2019-06-30

## 2019-06-30 RX ORDER — POTASSIUM CHLORIDE 29.8 MG/ML
40 INJECTION INTRAVENOUS ONCE
Status: COMPLETED | OUTPATIENT
Start: 2019-06-30 | End: 2019-06-30

## 2019-06-30 RX ORDER — POTASSIUM CHLORIDE 20 MEQ/1
40 TABLET, EXTENDED RELEASE ORAL DAILY
Status: DISCONTINUED | OUTPATIENT
Start: 2019-06-30 | End: 2019-07-01

## 2019-06-30 NOTE — PHYSICAL THERAPY NOTE
Per chart review , last PT note recommended follow up on Monday 7/01/19  And per OT note today , pt is still pending hospice eval     Not appropriate today .      PT will follow up Monday 7/01 as appropriate

## 2019-06-30 NOTE — PLAN OF CARE
Problem: Patient Centered Care  Goal: Patient preferences are identified and integrated in the patient's plan of care  Description  Interventions:  - What would you like us to know as we care for you? patient likes her water with no ice and no straw.    - strengthening/mobility  - Encourage toileting schedule  Outcome: Progressing     Problem: METABOLIC/FLUID AND ELECTROLYTES - ADULT  Goal: Hemodynamic stability and optimal renal function maintained  Description  INTERVENTIONS:  - Monitor labs and assess fo

## 2019-06-30 NOTE — PROGRESS NOTES
[facility] AND CLINICS  Progress Note    Sandra Cord Patient Status:  Inpatient    1945 MRN [id number]   Location [facility] 2W/SW Attending Zaire Zeng MD   [address] Day [address] 4 PCP Laura Lowe MD     Subjective:    Same respiratory status   [facility] differential added    MANUAL DIFFERENTIAL    Collection Time: 06/30/19  3:54 AM   Result Value Ref Range    Neutrophil Absolute Manual 13.34 (H) 1.50 - 7.70 x10(3) uL    Lymphocyte Absolute Manual 0.15 (L) 1.00 - 4.00 x10(3) uL    Monocyte Absolute Manual

## 2019-06-30 NOTE — PROGRESS NOTES
[facility] Hospitalist Progress Note     CC: Hospital Follow up    PCP: Elvan Sever, MD       Assessment/Plan:     Principal Problem:    Sepsis, due to unspecified organism [facility])  Active Problems:    Fever    Neutropenic fever ([address]    Pneumonia of left lower l diet     DVT Prophy:scd, lovneox  Atrophy: is   Lines: Port, piv     Dispo: pending clinical course, maintain PCU status    Questions/concerns were discussed with patient and/or family by bedside.     Thank Manny Morelos MD    [address] [place] [address] --  116* 150*   BUN 6*  --  13 21*   CREATSERUM 0.58  --  0.67 0.93   GFRAA 106  --  102 71   GFRNAA 92  --  88 62   CA 8.3*  --  8.9 9.4   *  --  137 138   K 2.7* 2.4* 3.6 2.6*     --  100 93*   CO2 25.0  --  31.0 37.0*       Recent Labs   La enoxaparin  40 mg Subcutaneous Daily       LORazepam, fentaNYL citrate, ipratropium-albuterol, Normal Saline Flush, acetaminophen, ondansetron HCl, Metoclopramide HCl

## 2019-06-30 NOTE — OCCUPATIONAL THERAPY NOTE
CARLOS ramírez re-attempted this AM.  Spoke with RN who reported pt is still pending possible hospice. Suggested to follow-up with therapy tomorrow (7/1).

## 2019-06-30 NOTE — PROGRESS NOTES
[address] Patient Status:  Inpatient    1945 MRN [id number]   Location [facility] 2W/SW Attending Diane Quintanilla MD   [facility] Day # 4 PCP Thiago Garcia MD     Critical Care Progress Note     S: Remains on vapotherm.   Ativan 06/30/2019    RBC 3.47 06/30/2019    HGB 10.5 06/30/2019    HCT 30.5 06/30/2019    MCV 87.9 06/30/2019    MCH 30.3 06/30/2019    MCHC 34.4 06/30/2019    RDW 15.9 06/30/2019    .0 06/30/2019     Lab Results   Component Value Date     06/30/2019

## 2019-06-30 NOTE — PROGRESS NOTES
120 [facility] dosing service    Follow-up Pharmacokinetic Consult for Vancomycin Dosing     Trell Garcia is a 68year old female who is being treated for HCAP- pneumonia. Patient is on day 4 of Vancomycin and is currently receiving 1.25 gm IV Q 24 hours. Time: 06/26/19  7:56 PM   Result Value Ref Range    Blood Culture Result No Growth 3 Days N/A       Based on the above:    1.  Increase Vancomycin to 1.25 gm IVPB Q 12 hours (based on subtherapeutic Trough of 3.7 ug/mL, pharmacokinetics, adjusted body weigh

## 2019-07-01 ENCOUNTER — APPOINTMENT (OUTPATIENT)
Dept: GENERAL RADIOLOGY | Facility: HOSPITAL | Age: 74
DRG: 871 | End: 2019-07-01
Attending: HOSPITALIST
Payer: MEDICARE

## 2019-07-01 PROBLEM — Z71.89 COUNSELING REGARDING ADVANCE CARE PLANNING AND GOALS OF CARE: Status: ACTIVE | Noted: 2019-07-01

## 2019-07-01 LAB
ANION GAP SERPL CALC-SCNC: 7 MMOL/L (ref 0–18)
BASOPHILS # BLD: 0 X10(3) UL (ref 0–0.2)
BASOPHILS NFR BLD: 0 %
BUN BLD-MCNC: 42 MG/DL (ref 7–18)
BUN/CREAT SERPL: 28 (ref 10–20)
CALCIUM BLD-MCNC: 9.3 MG/DL (ref 8.5–10.1)
CHLORIDE SERPL-SCNC: 98 MMOL/L (ref 98–112)
CO2 SERPL-SCNC: 35 MMOL/L (ref 21–32)
CREAT BLD-MCNC: 1.5 MG/DL (ref 0.55–1.02)
DEPRECATED RDW RBC AUTO: 51.9 FL (ref 35.1–46.3)
EOSINOPHIL # BLD: 0 X10(3) UL (ref 0–0.7)
EOSINOPHIL NFR BLD: 0 %
ERYTHROCYTE [DISTWIDTH] IN BLOOD BY AUTOMATED COUNT: 15.9 % (ref 11–15)
GLUCOSE BLD-MCNC: 146 MG/DL (ref 70–99)
HAV IGM SER QL: 1.8 MG/DL (ref 1.6–2.6)
HCT VFR BLD AUTO: 28.9 % (ref 35–48)
HGB BLD-MCNC: 9.8 G/DL (ref 12–16)
LYMPHOCYTES NFR BLD: 0.81 X10(3) UL (ref 1–4)
LYMPHOCYTES NFR BLD: 3 %
MCH RBC QN AUTO: 30 PG (ref 26–34)
MCHC RBC AUTO-ENTMCNC: 33.9 G/DL (ref 31–37)
MCV RBC AUTO: 88.4 FL (ref 80–100)
MONOCYTES # BLD: 2.16 X10(3) UL (ref 0.1–1)
MONOCYTES NFR BLD: 8 %
MORPHOLOGY: NORMAL
NEUTROPHILS # BLD AUTO: 23.62 X10 (3) UL (ref 1.5–7.7)
NEUTROPHILS NFR BLD: 82 %
NEUTS BAND NFR BLD: 7 %
NEUTS HYPERSEG # BLD: 24.03 X10(3) UL (ref 1.5–7.7)
OSMOLALITY SERPL CALC.SUM OF ELEC: 303 MOSM/KG (ref 275–295)
PLATELET # BLD AUTO: 296 10(3)UL (ref 150–450)
PLATELET MORPHOLOGY: NORMAL
POTASSIUM SERPL-SCNC: 3.5 MMOL/L (ref 3.5–5.1)
RBC # BLD AUTO: 3.27 X10(6)UL (ref 3.8–5.3)
SODIUM SERPL-SCNC: 140 MMOL/L (ref 136–145)
TOTAL CELLS COUNTED: 100
VANCOMYCIN TROUGH SERPL-MCNC: 29.7 UG/ML (ref 10–20)
WBC # BLD AUTO: 27 X10(3) UL (ref 4–11)

## 2019-07-01 PROCEDURE — 99221 1ST HOSP IP/OBS SF/LOW 40: CPT | Performed by: INTERNAL MEDICINE

## 2019-07-01 PROCEDURE — 71045 X-RAY EXAM CHEST 1 VIEW: CPT | Performed by: HOSPITALIST

## 2019-07-01 RX ORDER — POTASSIUM CHLORIDE 1.5 G/1.77G
40 POWDER, FOR SOLUTION ORAL DAILY
Status: DISCONTINUED | OUTPATIENT
Start: 2019-07-02 | End: 2019-07-05

## 2019-07-01 RX ORDER — FUROSEMIDE 10 MG/ML
20 INJECTION INTRAMUSCULAR; INTRAVENOUS DAILY
Status: DISCONTINUED | OUTPATIENT
Start: 2019-07-01 | End: 2019-07-05

## 2019-07-01 RX ORDER — MAGNESIUM OXIDE 400 MG (241.3 MG MAGNESIUM) TABLET
400 TABLET ONCE
Status: COMPLETED | OUTPATIENT
Start: 2019-07-01 | End: 2019-07-01

## 2019-07-01 RX ORDER — POTASSIUM CHLORIDE 29.8 MG/ML
40 INJECTION INTRAVENOUS ONCE
Status: COMPLETED | OUTPATIENT
Start: 2019-07-01 | End: 2019-07-01

## 2019-07-01 RX ORDER — ENOXAPARIN SODIUM 100 MG/ML
30 INJECTION SUBCUTANEOUS DAILY
Status: DISCONTINUED | OUTPATIENT
Start: 2019-07-01 | End: 2019-07-05

## 2019-07-01 RX ORDER — FUROSEMIDE 10 MG/ML
40 INJECTION INTRAMUSCULAR; INTRAVENOUS
Status: DISCONTINUED | OUTPATIENT
Start: 2019-07-01 | End: 2019-07-01

## 2019-07-01 NOTE — PROGRESS NOTES
Pulmonary Progress Note     Assessment / Plan:  1. Acute hypoxic respiratory failure - due to PNA, ARDS, pulmonary edema. Improved today  - on vapotherm, wean as tolerated.  Refusing bipap  - trial of IV steroids for severe pneumonia and bronchospasm  - abx

## 2019-07-01 NOTE — PROGRESS NOTES
[facility] Hospitalist Progress Note     CC: Hospital Follow up    PCP: Jenny Vazquez MD       Assessment/Plan:     Principal Problem:    Sepsis, due to unspecified organism [facility])  Active Problems:    Fever    Neutropenic fever ([address]    Pneumonia of left lower l continue with treatment as long a improvement noted     FN:  - IVF: stopped  - Diet: adv diet     DVT Prophy:scd, lovneox  Atrophy: is   Lines: Port, piv     Dispo: pending clinical course, maintain PCU status    Questions/concerns were discussed with arelis Recent Labs   Lab 06/29/19  0534 06/30/19  0354 06/30/19  1700 07/01/19  0418   * 150*  --  146*   BUN 13 21*  --  42*   CREATSERUM 0.67 0.93  --  1.50*   GFRAA 102 71  --  40*   GFRNAA 88 62  --  35*   CA 8.9 9.4  --  9.3    138  --

## 2019-07-01 NOTE — PHYSICAL THERAPY NOTE
Pt order received and chart reviewed. Attempted to see pt however  pt cont to to require >80% fio2 at this time. Will follow up later as schedule and functional respiratory levels improves otherwise will be seen tomorrow. Nursing is aware.

## 2019-07-01 NOTE — PROGRESS NOTES
120 [facility] dosing service    Follow-up Pharmacokinetic Consult for Vancomycin Dosing     Twyla Montoya is a 68year old female who is being treated for PNA. Day 6.     She is allergic to adhesive tape; bactrim [sulfamethoxazole w/trimethoprim]; codeine p

## 2019-07-01 NOTE — CONSULTS
[address] [facility]  [id number]  Hospital Day #5  Date of Consult: 07/01/19  Patient seen at: [facility]    Reason for Consultation:      Consult requested by Dr Manasa Stapleton for evaluation of CHOLECYSTECTOMY      2009   • COLONOSCOPY      2008 panendoscopy normal    • ESOPHAGOGASTRODUODENOSCOPY, COLONOSCOPY, POSSIBLE BIOPSY, POSSIBLE POLYPECTOMY 52572, 60794 N/A 2/10/2017    Performed by July Wayne MD at [address] [place] [address] Intravenous, Q8H  •  LORazepam (ATIVAN) tab 0.5 mg, 0.5 mg, Oral, Q6H PRN  •  methylPREDNISolone Sodium Succ (Solu-MEDROL) injection 60 mg, 60 mg, Intravenous, Q8H  •  Vancomycin HCl (VANCOCIN) 1,250 mg in sodium chloride 0.9% 500 mL IVPB, 1,250 mg, Ryder Dickerson opacification with improvement. Differential would include pulmonary edema/congestive failure versus multifocal pneumonitis.      Dictated by (CST): Daniele Navarrete MD on 7/01/2019 at 8:31     Approved by (CST): Daniele Navarrete MD on 7/01/2019 at 8 do any work  coma  Max Assist  Total Care Mouth care Drowsy or coma   0 Death     Palliative Care Assessment     Goals of Care: I introduced palliative care and reason for consultation. I provided education about palliative care services.  I discussed the b her medical chart which notes DNR CODE STATUS, no intubation in Section A  She requested comfort care in Section B  WHILE HER REQUESTS ABOVE ARE MORE IN LINE WITH SELECTIVE TREATMENTS IN SECTION B, WILL HOLD ON CHANGING POLST FORM AT THIS TIME    HCPOA: Care services to participate in the care of Candelaria Us    A total of 30 minutes were spent on this consult; which included all of the following: direct face to face contact, history taking, physical examination and >50% was spent counseling and coordinating car

## 2019-07-01 NOTE — PLAN OF CARE
Problem: Patient Centered Care  Goal: Patient preferences are identified and integrated in the patient's plan of care  Description  Interventions:  - What would you like us to know as we care for you?   - Provide timely, complete, and accurate informatio Modify environment to reduce risk of injury  - Provide assistive devices as appropriate  - Consider OT/PT consult to assist with strengthening/mobility  - Encourage toileting schedule  7/1/2019 1606 by Elvia Hopkins RN  Outcome: Progressing  7/1/2019 1 patient for signs and symptoms of electrolyte imbalances  - Administer electrolyte replacement as ordered  - Monitor response to electrolyte replacements, including rhythm and repeat lab results as appropriate  - Fluid restriction as ordered  - Instruct pa

## 2019-07-01 NOTE — OCCUPATIONAL THERAPY NOTE
Orders received, chart review complete, RN consulted and approved pt participation on OT evaluation however, upon approach pt is continuing to require >80% fio2, therapy will follow up 7/2/19 when pt respiratory needs are within more fx levels.      Temitope

## 2019-07-01 NOTE — PROGRESS NOTES
[facility] Pharmacy Note:  Renal Adjustment for Cefepime     Carmen Haddad is a 68year old female who has been prescribed 1g every 8 hrs. CrCl is estimated creatinine clearance is 28 mL/min (A) (based on SCr of 1.5 mg/dL (H)).  so the dose has been adjusted to

## 2019-07-01 NOTE — PROGRESS NOTES
Hematology/Oncology follow up Note    Still in icu   Feeling poorly    Past Medical History:   Diagnosis Date   • Breast cancer [facility])    • Breast injury 2016    left breast    • Essential hypertension    • Fibrocystic breast 2016    left breast   • GERD vancomycin  1,250 mg Intravenous Q12H   • cefepime  1 g Intravenous Q8H   • Vancomycin HCl  125 mg Oral Q6H   • Fluticasone Propionate  1 spray Each Nare Daily   • Metoprolol Succinate ER  25 mg Oral Nightly   • Pantoprazole Sodium  40 mg Oral BID AC   • e 31.0 37.0*  --  35.0*   ALKPHO 51*  --  48* 43*  --   --   --   --   --    AST 62*  --  61* 70*  --   --   --   --   --    ALT 42  --  46 42  --   --   --   --   --    BILT 0.77  --  0.7 0.8  --   --   --   --   --    TP 6.2  --  5.6* 5.2*  --   --   --

## 2019-07-01 NOTE — PLAN OF CARE
Problem: METABOLIC/FLUID AND ELECTROLYTES - ADULT  Goal: Electrolytes maintained within normal limits  Description  INTERVENTIONS:  - Monitor labs and rhythm and assess patient for signs and symptoms of electrolyte imbalances  - Administer electrolyte re ventilation and oxygenation  Description  INTERVENTIONS:  - Assess for changes in respiratory status  - Assess for changes in mentation and behavior  - Position to facilitate oxygenation and minimize respiratory effort  - Oxygen supplementation based on ox

## 2019-07-02 LAB
ALBUMIN SERPL-MCNC: 2.2 G/DL (ref 3.4–5)
ALBUMIN/GLOB SERPL: 0.8 {RATIO} (ref 1–2)
ALP LIVER SERPL-CCNC: 63 U/L (ref 55–142)
ALT SERPL-CCNC: 29 U/L (ref 13–56)
ANION GAP SERPL CALC-SCNC: 7 MMOL/L (ref 0–18)
AST SERPL-CCNC: 22 U/L (ref 15–37)
BASOPHILS # BLD: 0 X10(3) UL (ref 0–0.2)
BASOPHILS NFR BLD: 0 %
BILIRUB SERPL-MCNC: 0.5 MG/DL (ref 0.1–2)
BUN BLD-MCNC: 53 MG/DL (ref 7–18)
BUN/CREAT SERPL: 30.6 (ref 10–20)
CALCIUM BLD-MCNC: 9.2 MG/DL (ref 8.5–10.1)
CHLORIDE SERPL-SCNC: 97 MMOL/L (ref 98–112)
CO2 SERPL-SCNC: 34 MMOL/L (ref 21–32)
CREAT BLD-MCNC: 1.73 MG/DL (ref 0.55–1.02)
DEPRECATED RDW RBC AUTO: 51.8 FL (ref 35.1–46.3)
EOSINOPHIL # BLD: 0 X10(3) UL (ref 0–0.7)
EOSINOPHIL NFR BLD: 0 %
ERYTHROCYTE [DISTWIDTH] IN BLOOD BY AUTOMATED COUNT: 15.9 % (ref 11–15)
GLOBULIN PLAS-MCNC: 2.7 G/DL (ref 2.8–4.4)
GLUCOSE BLD-MCNC: 151 MG/DL (ref 70–99)
HAV IGM SER QL: 2.1 MG/DL (ref 1.6–2.6)
HCT VFR BLD AUTO: 28.7 % (ref 35–48)
HGB BLD-MCNC: 9.6 G/DL (ref 12–16)
LYMPHOCYTES NFR BLD: 0.88 X10(3) UL (ref 1–4)
LYMPHOCYTES NFR BLD: 3 %
M PROTEIN MFR SERPL ELPH: 4.9 G/DL (ref 6.4–8.2)
MCH RBC QN AUTO: 29.5 PG (ref 26–34)
MCHC RBC AUTO-ENTMCNC: 33.4 G/DL (ref 31–37)
MCV RBC AUTO: 88.3 FL (ref 80–100)
MONOCYTES # BLD: 0.29 X10(3) UL (ref 0.1–1)
MONOCYTES NFR BLD: 1 %
NEUTROPHILS # BLD AUTO: 25.14 X10 (3) UL (ref 1.5–7.7)
NEUTROPHILS NFR BLD: 94 %
NEUTS BAND NFR BLD: 2 %
NEUTS HYPERSEG # BLD: 28.03 X10(3) UL (ref 1.5–7.7)
OSMOLALITY SERPL CALC.SUM OF ELEC: 303 MOSM/KG (ref 275–295)
PLATELET # BLD AUTO: 267 10(3)UL (ref 150–450)
PLATELET MORPHOLOGY: NORMAL
POTASSIUM SERPL-SCNC: 2.9 MMOL/L (ref 3.5–5.1)
RBC # BLD AUTO: 3.25 X10(6)UL (ref 3.8–5.3)
SODIUM SERPL-SCNC: 138 MMOL/L (ref 136–145)
TOTAL CELLS COUNTED: 100
TOXIC GRANULES BLD QL SMEAR: PRESENT
WBC # BLD AUTO: 29.2 X10(3) UL (ref 4–11)

## 2019-07-02 PROCEDURE — 99231 SBSQ HOSP IP/OBS SF/LOW 25: CPT | Performed by: INTERNAL MEDICINE

## 2019-07-02 RX ORDER — POTASSIUM CHLORIDE 29.8 MG/ML
40 INJECTION INTRAVENOUS ONCE
Status: COMPLETED | OUTPATIENT
Start: 2019-07-02 | End: 2019-07-02

## 2019-07-02 RX ORDER — POTASSIUM CHLORIDE 14.9 MG/ML
20 INJECTION INTRAVENOUS ONCE
Status: COMPLETED | OUTPATIENT
Start: 2019-07-02 | End: 2019-07-02

## 2019-07-02 RX ORDER — METOCLOPRAMIDE HYDROCHLORIDE 5 MG/ML
5 INJECTION INTRAMUSCULAR; INTRAVENOUS EVERY 8 HOURS PRN
Status: DISCONTINUED | OUTPATIENT
Start: 2019-07-02 | End: 2019-07-12

## 2019-07-02 NOTE — PROGRESS NOTES
Pulmonary Progress Note     Assessment / Plan:  1. Acute hypoxic respiratory failure - due to PNA, ARDS, pulmonary edema. Improved today  - on vapotherm, wean as tolerated.  Refusing bipap  - cont trial of IV steroids for severe pneumonia and bronchospasm

## 2019-07-02 NOTE — PLAN OF CARE
Problem: Patient Centered Care  Goal: Patient preferences are identified and integrated in the patient's plan of care  Description  Interventions:  - What would you like us to know as we care for you?  - Provide timely, complete, and accurate information Identify cognitive and physical deficits and behaviors that affect risk of falls.   - [place] fall precautions as indicated by assessment.  - Educate pt/family on patient safety including physical limitations  - Instruct pt to call for assistance with act patient weight  - Monitor urine specific gravity, serum osmolarity and serum sodium as indicated or ordered  - Monitor response to interventions for patient's volume status, including labs, urine output, blood pressure (other measures as available)  Vikas Waldrop

## 2019-07-02 NOTE — PHYSICAL THERAPY NOTE
PHYSICAL THERAPY EVALUATION - INPATIENT     Room Number: 217/217-A  Evaluation Date: 7/2/2019  Type of Evaluation: Initial   Physician Order: PT Eval and Treat    Presenting Problem: sepsis; acute hypoxic respiratory failure; pneumonia; WENDIE; pulmonary ed Potential : Fair  Frequency (Obs): 3x/week       PHYSICAL THERAPY MEDICAL/SOCIAL HISTORY     History related to current admission: Per H&P: \"Patient is a 68year old female with PMH sig for HTN, GERD, and Breast Cancer HER2+ on neoadjuvant chemo, last dos urethra polyp 1970   • OTHER SURGICAL HISTORY      colposcopy ascus 2006 fibroids dx   • TONSILLECTOMY         HOME SITUATION  Type of Home: House   Home Layout: (2 level - stays on main level )  Stairs to Enter : (several stairs to enter)     Stairs to Be light within reach; With [facility] staff;RN aware of session/findings; All patient questions and concerns addressed; Alarm set; Discussed recommendations with /    CURRENT GOALS    Goals to be met by: 7/16/19  Patient Goal Patient's self-stat

## 2019-07-02 NOTE — PLAN OF CARE
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   - [place] fall precautions as indicated by assessment.  - Educate pt/family on patient safety including physical limitations  - Instruct pt to call for a intake, output and patient weight  - Monitor urine specific gravity, serum osmolarity and serum sodium as indicated or ordered  - Monitor response to interventions for patient's volume status, including labs, urine output, blood pressure (other measures as

## 2019-07-02 NOTE — PROGRESS NOTES
[address]  [id number]  Hospital Day #6  Date of Consult: 07/01/19  Patient seen at: [facility] AND [facility] Room 217    Subjective:      Patient was seen and examined with no family at the bedside. 25-50 mcg, Intravenous, Q2H PRN  •  Vancomycin HCl (FIRVANQ) 50 MG/ML oral solution 125 mg, 125 mg, Oral, Q6H  •  ipratropium-albuterol (DUONEB) nebulizer solution 3 mL, 3 mL, Nebulization, Q4H PRN  •  Fluticasone Propionate (FLONASE) 50 MCG/ACT nasal spra Location: Right arm)   Pulse 64   Temp 97.1 °F (36.2 °C) (Temporal)   Resp 25   Ht 5' 3\" (1.6 m)   Wt 179 lb 6.4 oz (81.4 kg)   SpO2 94%   BMI 31.78 kg/m²     General: Alert, awake and in no apparent respiratory distress.   Body habitus: Overweight BMI 31 time    HCPOA:        Healthcare Agent Appointed: Yes  Healthcare Agent's Name: Fahad Ervin her son  Healthcare Agent's Phone Number: [phone]     Spiritual needs addressed: Unable to assess  Disposition: ongoing goals of care discussions    Problem Relda Fraction

## 2019-07-02 NOTE — RESPIRATORY THERAPY NOTE
Per Dr Parish Ca, Respiratory goal today will be to decrease FIO2 to 50%. RT attempted to decrease oxygen to 60%, PT desaturated to 90%.

## 2019-07-02 NOTE — RESPIRATORY THERAPY NOTE
Received PT on AIRVO 60L 60%. Given report goal is to try to go down to 50%. Patting saurating 95% on  60L 60%. Lowered FIO2 to 50%. Patient stable and tolerating with saturation at 93%.  Will notify RN of change, and will continue to monitor PT.

## 2019-07-02 NOTE — PROGRESS NOTES
[facility] Pharmacy Note:  Renal Dose Adjustment for Metoclopramide (REGLAN)    Crystal Davies has been prescribed Metoclopramide (REGLAN) 10 mg every 8 hours as needed for n/v.    Estimated Creatinine Clearance: 24.3 mL/min (A) (based on SCr of 1.73 mg/dL (H)).

## 2019-07-02 NOTE — PROGRESS NOTES
[facility] Hospitalist Progress Note     CC: Hospital Follow up    PCP: Jovani Colvin MD       Assessment/Plan:     Principal Problem:    Sepsis, due to unspecified organism [facility])  Active Problems:    Fever    Neutropenic fever ([address]    Pneumonia of left lower l continue with treatment as long a improvement noted     FN:  - IVF: stopped  - Diet: adv diet     DVT Prophy:scd, lovneox  Atrophy: is   Lines: Port, piv     Dispo: pending clinical course, maintain PCU status    Questions/concerns were discussed with arelis 267.0         Recent Labs   Lab 06/30/19  0354 06/30/19  1700 07/01/19  0418 07/02/19  0443   *  --  146* 151*   BUN 21*  --  42* 53*   CREATSERUM 0.93  --  1.50* 1.73*   GFRAA 71  --  40* 34*   GFRNAA 62  --  35* 29*   CA 9.4  --  9.3 9.2

## 2019-07-02 NOTE — PROGRESS NOTES
Hematology/Oncology follow up Note    Still in icu   02 requirements are improving     Past Medical History:   Diagnosis Date   • Breast cancer [facility])    • Breast injury 2016    left breast    • Essential hypertension    • Fibrocystic breast 2016    left br chloride  40 mEq Oral Daily   • metRONIDAZOLE  500 mg Intravenous Q8H   • MethylPREDNISolone Sodium Succ  60 mg Intravenous Q8H   • Vancomycin HCl  125 mg Oral Q6H   • Fluticasone Propionate  1 spray Each Nare Daily   • Metoprolol Succinate ER  25 mg Oral 42  --   --   --   --  29   BILT 0.7 0.8  --   --   --   --  0.5   TP 5.6* 5.2*  --   --   --   --  4.9*    < > = values in this interval not displayed.        Imaging:   Reviewed in chart     Assessment/Plan:  Carlota Davis is a 68 y.o female with breast

## 2019-07-03 LAB
ANION GAP SERPL CALC-SCNC: 7 MMOL/L (ref 0–18)
BASOPHILS # BLD: 0 X10(3) UL (ref 0–0.2)
BASOPHILS NFR BLD: 0 %
BUN BLD-MCNC: 58 MG/DL (ref 7–18)
BUN/CREAT SERPL: 36.9 (ref 10–20)
CALCIUM BLD-MCNC: 9.1 MG/DL (ref 8.5–10.1)
CHLORIDE SERPL-SCNC: 100 MMOL/L (ref 98–112)
CO2 SERPL-SCNC: 31 MMOL/L (ref 21–32)
CREAT BLD-MCNC: 1.57 MG/DL (ref 0.55–1.02)
DEPRECATED RDW RBC AUTO: 51.8 FL (ref 35.1–46.3)
EOSINOPHIL # BLD: 0 X10(3) UL (ref 0–0.7)
EOSINOPHIL NFR BLD: 0 %
ERYTHROCYTE [DISTWIDTH] IN BLOOD BY AUTOMATED COUNT: 16.2 % (ref 11–15)
GLUCOSE BLD-MCNC: 151 MG/DL (ref 70–99)
HAV IGM SER QL: 2.1 MG/DL (ref 1.6–2.6)
HCT VFR BLD AUTO: 28 % (ref 35–48)
HGB BLD-MCNC: 9.6 G/DL (ref 12–16)
LYMPHOCYTES NFR BLD: 0.25 X10(3) UL (ref 1–4)
LYMPHOCYTES NFR BLD: 1 %
MCH RBC QN AUTO: 30.1 PG (ref 26–34)
MCHC RBC AUTO-ENTMCNC: 34.3 G/DL (ref 31–37)
MCV RBC AUTO: 87.8 FL (ref 80–100)
MONOCYTES # BLD: 0.74 X10(3) UL (ref 0.1–1)
MONOCYTES NFR BLD: 3 %
NEUTROPHILS # BLD AUTO: 21.01 X10 (3) UL (ref 1.5–7.7)
NEUTROPHILS NFR BLD: 93 %
NEUTS BAND NFR BLD: 3 %
NEUTS HYPERSEG # BLD: 23.52 X10(3) UL (ref 1.5–7.7)
OSMOLALITY SERPL CALC.SUM OF ELEC: 305 MOSM/KG (ref 275–295)
PLATELET # BLD AUTO: 246 10(3)UL (ref 150–450)
PLATELET MORPHOLOGY: NORMAL
POTASSIUM SERPL-SCNC: 3.2 MMOL/L (ref 3.5–5.1)
POTASSIUM SERPL-SCNC: 3.7 MMOL/L (ref 3.5–5.1)
RBC # BLD AUTO: 3.19 X10(6)UL (ref 3.8–5.3)
SODIUM SERPL-SCNC: 138 MMOL/L (ref 136–145)
TOTAL CELLS COUNTED: 100
TOXIC GRANULES BLD QL SMEAR: PRESENT
WBC # BLD AUTO: 24.5 X10(3) UL (ref 4–11)

## 2019-07-03 PROCEDURE — 99231 SBSQ HOSP IP/OBS SF/LOW 25: CPT | Performed by: REGISTERED NURSE

## 2019-07-03 RX ORDER — METHYLPREDNISOLONE SODIUM SUCCINATE 125 MG/2ML
60 INJECTION, POWDER, LYOPHILIZED, FOR SOLUTION INTRAMUSCULAR; INTRAVENOUS 2 TIMES DAILY
Status: DISCONTINUED | OUTPATIENT
Start: 2019-07-03 | End: 2019-07-05

## 2019-07-03 RX ORDER — POTASSIUM CHLORIDE 14.9 MG/ML
20 INJECTION INTRAVENOUS ONCE
Status: COMPLETED | OUTPATIENT
Start: 2019-07-03 | End: 2019-07-03

## 2019-07-03 RX ORDER — POTASSIUM CHLORIDE 1.5 G/1.77G
40 POWDER, FOR SOLUTION ORAL ONCE
Status: COMPLETED | OUTPATIENT
Start: 2019-07-03 | End: 2019-07-03

## 2019-07-03 RX ORDER — POTASSIUM CHLORIDE 29.8 MG/ML
40 INJECTION INTRAVENOUS ONCE
Status: COMPLETED | OUTPATIENT
Start: 2019-07-03 | End: 2019-07-03

## 2019-07-03 NOTE — OCCUPATIONAL THERAPY NOTE
OCCUPATIONAL THERAPY TREATMENT NOTE - INPATIENT        Room Number: 217/217-A           Presenting Problem: (sepsis, neutropenia, sepsis /CA)    Problem List  Principal Problem:    Sepsis, due to unspecified organism [facility])  Active Problems:    Fever    Wanda BP: 110/76  BP Location: Right arm  BP Method: Automatic  Patient Position: Sitting    O2 SATURATIONS        SPO2 Ambulation on Oxygen: 97  Ambulation oxygen flow (liters per minute): (AirVo 50L 49% Fi02)    ACTIVITIES OF DAILY LIVING ASSESSMENT  AM-P

## 2019-07-03 NOTE — PROGRESS NOTES
Hematology/Oncology follow up Note    Overall improving clinical status     Past Medical History:   Diagnosis Date   • Breast cancer [facility])    • Breast injury 2016    left breast    • Essential hypertension    • Fibrocystic breast 2016    left breast   • GE vancomycin  1,250 mg Intravenous Q24H   • potassium chloride  40 mEq Oral Daily   • metRONIDAZOLE  500 mg Intravenous Q8H   • vancomycin HCl  125 mg Oral Q6H   • Fluticasone Propionate  1 spray Each Nare Daily   • Metoprolol Succinate ER  25 mg Oral Nightl values in this interval not displayed.        Imaging:   Reviewed in chart     Assessment/Plan:  Otis Daniel is a 68 y.o female with breast cancer on chemotherapy with neutropenic fever    Breast Cancer  - HER2 positive inflammatory disease currently on

## 2019-07-03 NOTE — PALLIATIVE CARE NOTE
[place] [facility]  Palliative Care Follow Up    Vish Bowels Patient Status:  Inpatient    1945 MRN [id number]   Location [facility] 2W/SW Attending Rachel Meehan MD   [facility] Day # 7 PCP Miladys Mortensen MD     Date of Consult: 7/3/2019 RASH    Comment:Cloth tape only, plastic okay  Bactrim [Sulfametho*      Codeine Phosphate       RASH  Opioid Analgesics         Penicillins             RASH  Doxycycline             RASH    Medications:     Current Facility-Administered Medications:   • Date    WBC 24.5 (H) 07/03/2019    HGB 9.6 (L) 07/03/2019    HCT 28.0 (L) 07/03/2019    .0 07/03/2019       Coags:No results found for: PT, INR, PTT    Chemistry:  Lab Results   Component Value Date    CREATSERUM 1.57 (H) 07/03/2019    BUN 58 (H) 07 Care:  Discussed with Patient: yes  Patient's preference about sharing medical information: speak to pt and son Linus Ibanez  Patient's decision making preferences: speak to pt  Code status: DNR  Have advanced directives been discussed with patient or healthcare p to patient/family today: Yes      Palliative Care Follow Up:    A total of 15 mins were spent on this consult, which included all of the following:direct face to face contact, history taking, physical examination, and >50% was spent counseling and coordina

## 2019-07-03 NOTE — PLAN OF CARE
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   - [place] fall precautions as indicated by assessment.  - Educate pt/family on patient safety including physical limitations  - Instruct pt to call for a Provide Smoking Cessation handout, if applicable  - Encourage broncho-pulmonary hygiene including cough, deep breathe, Incentive Spirometry  - Assess the need for suctioning and perform as needed  - Assess and instruct to report SOB or any respiratory diff

## 2019-07-03 NOTE — PROGRESS NOTES
[facility] Hospitalist Progress Note     CC: Hospital Follow up    PCP: Diane Cabot, MD       Assessment/Plan:     Principal Problem:    Sepsis, due to unspecified organism [facility])  Active Problems:    Fever    Neutropenic fever ([address]    Pneumonia of left lower l agreement.  Palliative following  - patient willing to continue with treatment as long a improvement noted     FN:  - IVF: stopped  - Diet: adv diet     DVT Prophy:scd, lovneox  Atrophy: is   Lines: Port, piv     Dispo: pending clinical course, maintain PCU 34.3   RDW 15.9* 15.9* 16.2*   NEPRELIM 23.62* 25.14* 21.01*   WBC 27.0* 29.2* 24.5*   .0 267.0 246.0         Recent Labs   Lab 07/01/19  0418 07/02/19  0443 07/03/19  0632   * 151* 151*   BUN 42* 53* 58*   CREATSERUM 1.50* 1.73* 1.57*   GFRA

## 2019-07-03 NOTE — PROGRESS NOTES
Pulmonary Progress Note     Assessment / Plan:  1. Acute hypoxic respiratory failure - due to PNA, ARDS, pulmonary edema.  Slowly improving  - on Airvo, wean as tolerated  - IV steroids for severe pneumonia and bronchospasm; decrease dose today  - abx as be

## 2019-07-03 NOTE — RESPIRATORY THERAPY NOTE
Upon RT arrival PT on 60L 50% saturating 98%. PT weaned to 45% on the FIO2. PT tolerating well saturating 96%/ RT will continue to monitor. Goal is to wean her to a high flow nasal canula. 1140     PT weaned to 50L 45%. Tolerating well.  Will continue to

## 2019-07-04 LAB
ANION GAP SERPL CALC-SCNC: 6 MMOL/L (ref 0–18)
BASOPHILS # BLD: 0 X10(3) UL (ref 0–0.2)
BASOPHILS NFR BLD: 0 %
BUN BLD-MCNC: 63 MG/DL (ref 7–18)
BUN/CREAT SERPL: 40.6 (ref 10–20)
CALCIUM BLD-MCNC: 9.7 MG/DL (ref 8.5–10.1)
CHLORIDE SERPL-SCNC: 103 MMOL/L (ref 98–112)
CO2 SERPL-SCNC: 30 MMOL/L (ref 21–32)
CREAT BLD-MCNC: 1.55 MG/DL (ref 0.55–1.02)
DEPRECATED RDW RBC AUTO: 53.5 FL (ref 35.1–46.3)
EOSINOPHIL # BLD: 0 X10(3) UL (ref 0–0.7)
EOSINOPHIL NFR BLD: 0 %
ERYTHROCYTE [DISTWIDTH] IN BLOOD BY AUTOMATED COUNT: 16.7 % (ref 11–15)
GLUCOSE BLD-MCNC: 146 MG/DL (ref 70–99)
HAV IGM SER QL: 2.2 MG/DL (ref 1.6–2.6)
HCT VFR BLD AUTO: 29.8 % (ref 35–48)
HGB BLD-MCNC: 9.9 G/DL (ref 12–16)
LYMPHOCYTES NFR BLD: 0.25 X10(3) UL (ref 1–4)
LYMPHOCYTES NFR BLD: 1 %
MCH RBC QN AUTO: 29.7 PG (ref 26–34)
MCHC RBC AUTO-ENTMCNC: 33.2 G/DL (ref 31–37)
MCV RBC AUTO: 89.5 FL (ref 80–100)
MONOCYTES # BLD: 1.01 X10(3) UL (ref 0.1–1)
MONOCYTES NFR BLD: 4 %
NEUTROPHILS # BLD AUTO: 21.06 X10 (3) UL (ref 1.5–7.7)
NEUTROPHILS NFR BLD: 93 %
NEUTS BAND NFR BLD: 2 %
NEUTS HYPERSEG # BLD: 23.94 X10(3) UL (ref 1.5–7.7)
OSMOLALITY SERPL CALC.SUM OF ELEC: 309 MOSM/KG (ref 275–295)
PLATELET # BLD AUTO: 265 10(3)UL (ref 150–450)
PLATELET MORPHOLOGY: NORMAL
POTASSIUM SERPL-SCNC: 4.7 MMOL/L (ref 3.5–5.1)
RBC # BLD AUTO: 3.33 X10(6)UL (ref 3.8–5.3)
SODIUM SERPL-SCNC: 139 MMOL/L (ref 136–145)
TOTAL CELLS COUNTED: 100
VANCOMYCIN TROUGH SERPL-MCNC: 22.5 UG/ML (ref 10–20)
WBC # BLD AUTO: 25.2 X10(3) UL (ref 4–11)

## 2019-07-04 RX ORDER — FUROSEMIDE 10 MG/ML
20 INJECTION INTRAMUSCULAR; INTRAVENOUS ONCE
Status: COMPLETED | OUTPATIENT
Start: 2019-07-04 | End: 2019-07-04

## 2019-07-04 NOTE — PROGRESS NOTES
[address] Patient Status:  Inpatient    1945 MRN [id number]   Location [facility] 2W/SW Attending Favian Castellon MD   [facility] Day # 8 PCP Diane Cabot, MD        SUBJECTIVE:Feels better.   Less tachypneic   OBJECTIVE:  Patie 07/04/2019    CREATSERUM 1.55 07/04/2019    BUN 63 07/04/2019     07/04/2019    K 4.7 07/04/2019     07/04/2019    CO2 30.0 07/04/2019     07/04/2019    CA 9.7 07/04/2019    MG 2.2 07/04/2019          Imaging: Reviewed.      ASSESSMENT/PL

## 2019-07-04 NOTE — PROGRESS NOTES
120 [facility] dosing service    Follow-up Pharmacokinetic Consult for Vancomycin Dosing     Twyla Montoya is a 68year old female who is being treated for pneumonia. Patient is on day 9 of Vancomycin and add is currently receiving 1.25 gm IV Q 24 hours.

## 2019-07-04 NOTE — PLAN OF CARE
Problem: Patient Centered Care  Goal: Patient preferences are identified and integrated in the patient's plan of care  Description  Interventions:  - What would you like us to know as we care for you?  Keep the fluid bag for my oxygen full  - Provide time Assess pt frequently for physical needs  - Identify cognitive and physical deficits and behaviors that affect risk of falls.   - [place] fall precautions as indicated by assessment.  - Educate pt/family on patient safety including physical limitations  - or deficit  - Monitor intake, output and patient weight  - Monitor urine specific gravity, serum osmolarity and serum sodium as indicated or ordered  - Monitor response to interventions for patient's volume status, including labs, urine output, blood press

## 2019-07-04 NOTE — PROGRESS NOTES
[facility] Hospitalist Progress Note     CC: Hospital Follow up    PCP: Jamie Carmichael MD       Assessment/Plan:     Principal Problem:    Sepsis, due to unspecified organism [facility])  Active Problems:    Fever    Neutropenic fever ([address]    Pneumonia of left lower l needed    GOC:  - patient states she is DNR, wants her Son Ryan Baldwin to be her POA who is an agreement.  Palliative following  - patient willing to continue with treatment as long a improvement noted     FN:  - IVF: stopped  - Diet: adv diet     DVT Prophy:scd, 07/04/19  0453   RBC 3.25* 3.19* 3.33*   HGB 9.6* 9.6* 9.9*   HCT 28.7* 28.0* 29.8*   MCV 88.3 87.8 89.5   MCH 29.5 30.1 29.7   MCHC 33.4 34.3 33.2   RDW 15.9* 16.2* 16.7*   NEPRELIM 25.14* 21.01* 21.06*   WBC 29.2* 24.5* 25.2*   .0 246.0 265.0

## 2019-07-05 LAB
ANION GAP SERPL CALC-SCNC: 4 MMOL/L (ref 0–18)
BASOPHILS # BLD: 0 X10(3) UL (ref 0–0.2)
BASOPHILS NFR BLD: 0 %
BUN BLD-MCNC: 61 MG/DL (ref 7–18)
BUN/CREAT SERPL: 41.5 (ref 10–20)
CALCIUM BLD-MCNC: 9.1 MG/DL (ref 8.5–10.1)
CHLORIDE SERPL-SCNC: 105 MMOL/L (ref 98–112)
CO2 SERPL-SCNC: 31 MMOL/L (ref 21–32)
CREAT BLD-MCNC: 1.47 MG/DL (ref 0.55–1.02)
DEPRECATED RDW RBC AUTO: 55.5 FL (ref 35.1–46.3)
EOSINOPHIL # BLD: 0 X10(3) UL (ref 0–0.7)
EOSINOPHIL NFR BLD: 0 %
ERYTHROCYTE [DISTWIDTH] IN BLOOD BY AUTOMATED COUNT: 16.9 % (ref 11–15)
GLUCOSE BLD-MCNC: 148 MG/DL (ref 70–99)
HAV IGM SER QL: 1.9 MG/DL (ref 1.6–2.6)
HCT VFR BLD AUTO: 28.8 % (ref 35–48)
HGB BLD-MCNC: 9.6 G/DL (ref 12–16)
LYMPHOCYTES NFR BLD: 0.84 X10(3) UL (ref 1–4)
LYMPHOCYTES NFR BLD: 4 %
MCH RBC QN AUTO: 30.1 PG (ref 26–34)
MCHC RBC AUTO-ENTMCNC: 33.3 G/DL (ref 31–37)
MCV RBC AUTO: 90.3 FL (ref 80–100)
MONOCYTES # BLD: 0.63 X10(3) UL (ref 0.1–1)
MONOCYTES NFR BLD: 3 %
MYELOCYTES # BLD: 0.21 X10(3) UL
MYELOCYTES NFR BLD: 1 %
NEUTROPHILS # BLD AUTO: 17.81 X10 (3) UL (ref 1.5–7.7)
NEUTROPHILS NFR BLD: 89 %
NEUTS BAND NFR BLD: 3 %
NEUTS HYPERSEG # BLD: 19.23 X10(3) UL (ref 1.5–7.7)
OSMOLALITY SERPL CALC.SUM OF ELEC: 310 MOSM/KG (ref 275–295)
PLATELET # BLD AUTO: 239 10(3)UL (ref 150–450)
PLATELET MORPHOLOGY: NORMAL
POTASSIUM SERPL-SCNC: 4.3 MMOL/L (ref 3.5–5.1)
RBC # BLD AUTO: 3.19 X10(6)UL (ref 3.8–5.3)
SODIUM SERPL-SCNC: 140 MMOL/L (ref 136–145)
TOTAL CELLS COUNTED: 100
WBC # BLD AUTO: 20.9 X10(3) UL (ref 4–11)

## 2019-07-05 PROCEDURE — 99231 SBSQ HOSP IP/OBS SF/LOW 25: CPT | Performed by: REGISTERED NURSE

## 2019-07-05 RX ORDER — METHYLPREDNISOLONE SODIUM SUCCINATE 40 MG/ML
40 INJECTION, POWDER, LYOPHILIZED, FOR SOLUTION INTRAMUSCULAR; INTRAVENOUS 2 TIMES DAILY
Status: DISCONTINUED | OUTPATIENT
Start: 2019-07-05 | End: 2019-07-05

## 2019-07-05 RX ORDER — LORAZEPAM 0.5 MG/1
0.5 TABLET ORAL EVERY 4 HOURS PRN
Status: DISCONTINUED | OUTPATIENT
Start: 2019-07-05 | End: 2019-07-12

## 2019-07-05 RX ORDER — MORPHINE SULFATE 20 MG/ML
10 SOLUTION ORAL
Status: DISCONTINUED | OUTPATIENT
Start: 2019-07-05 | End: 2019-07-12

## 2019-07-05 RX ORDER — FUROSEMIDE 10 MG/ML
20 INJECTION INTRAMUSCULAR; INTRAVENOUS 2 TIMES DAILY PRN
Status: DISCONTINUED | OUTPATIENT
Start: 2019-07-05 | End: 2019-07-09

## 2019-07-05 NOTE — DIETARY NOTE
ADULT NUTRITION REASSESSMENT     Pt is at moderate nutrition risk. Pt meets malnutrition criteria.       RECOMMENDATIONS TO MD:  None at this time     CRITERIA FOR MALNUTRITION DIAGNOSIS: Criteria for non-severe malnutrition diagnosis: chronic illness rela DNR/DNI/no feeding tubes/no bipap. [facility] pending respiratory improvements. Weaning from Airvo to [address] today with IV steroids tapering. Intake continues to be poor, consuming <50% of recorded trays.  Pt interviewed sitting up in  overweight  IBW: 115#         142% IBW       Usual Body Wt: 186# per pt        78% UBW  WEIGHT HISTORY:   Wt Readings from Last 6 Encounters:  07/04/19 : 84.4 kg (186 lb 1.6 oz)  06/17/19 : 75.7 kg (166 lb 14.4 oz)  05/30/19 : 77.1 kg (170 lb)  05/24/19 : --  139 140   K 3.2* 3.7 4.7 4.3     --  103 105   CO2 31.0  --  30.0 31.0   OSMOCALC 305*  --  309* 310*     NUTRITION RELATED PHYSICAL FINDINGS:   - Body Fat/Muscle Mass: well nourished but with significant unintentional wt loss noted.    - Fluid Ac

## 2019-07-05 NOTE — PALLIATIVE CARE NOTE
[facility] [facility]  Palliative Care Follow Up    Jazzy Samuels Patient Status:  Inpatient    1945 MRN [id number]   Location [facility] 2W/SW Attending Gerald Pires MD   [facility] Day # 9 PCP Albert Warren MD     Date of Consult: 2019 injection 5 mg, 5 mg, Intravenous, Q8H PRN  •  furosemide (LASIX) injection 20 mg, 20 mg, Intravenous, Daily  •  Cefepime HCl (MAXIPIME) 1 g in sodium chloride 0.9% 100 mL MBP/add-vantage, 1 g, Intravenous, Q12H  •  Enoxaparin Sodium (LOVENOX) 30 MG/0.3ML pulse 61, temperature 97.2 °F (36.2 °C), resp. rate 19, height 5' 3\" (1.6 m), weight 186 lb 1.6 oz (84.4 kg), SpO2 97 %, not currently breastfeeding. Body mass index is 32.97 kg/m².   Present Level of pain: 0  Non-verbal signs of pain present: NO    Physi details  -Confirmed wishes for DNR/DNI/no bipap/no feeding tubes and continue supportive care.   -If pt's condition worsens while hospitalized, she would likely move in comfort direction with hospice  -Dispo: likely ALPHONSE with community palliative care follow

## 2019-07-05 NOTE — CM/SW NOTE
Care Coordination Note    Progression of Care: Hospital Day # 9  Saw pt at bedside to assess her progress. She is resting in bed, A/O X4, on 3LNC, states she feels better, starting to eat a bit better, Son, Chas Ruelas present.      Pt with breast cancer on recent

## 2019-07-05 NOTE — PLAN OF CARE
Problem: Patient Centered Care  Goal: Patient preferences are identified and integrated in the patient's plan of care  Description  Interventions:  - What would you like us to know as we care for you?   - Provide timely, complete, and accurate informatio needs  - Identify cognitive and physical deficits and behaviors that affect risk of falls.   - [place] fall precautions as indicated by assessment.  - Educate pt/family on patient safety including physical limitations  - Instruct pt to call for assistance output and patient weight  - Monitor urine specific gravity, serum osmolarity and serum sodium as indicated or ordered  - Monitor response to interventions for patient's volume status, including labs, urine output, blood pressure (other measures as availab

## 2019-07-05 NOTE — PLAN OF CARE
Wean patient from Airvo-40% and 45 lpm to highflow nasal cannula 3 lpm.Patient tolerate well. SPO2- 97% and  RR-22.

## 2019-07-05 NOTE — PLAN OF CARE
Problem: Patient Centered Care  Goal: Patient preferences are identified and integrated in the patient's plan of care  Description  Interventions:  - What would you like us to know as we care for you?  \" I want to be comfortable\"  - Provide timely, comp frequently for physical needs  - Identify cognitive and physical deficits and behaviors that affect risk of falls.   - [place] fall precautions as indicated by assessment.  - Educate pt/family on patient safety including physical limitations  - Instruct p deficit  - Monitor intake, output and patient weight  - Monitor urine specific gravity, serum osmolarity and serum sodium as indicated or ordered  - Monitor response to interventions for patient's volume status, including labs, urine output, blood pressure

## 2019-07-05 NOTE — PHYSICAL THERAPY NOTE
PHYSICAL THERAPY TREATMENT NOTE - INPATIENT     Room Number: 217/217-A       Presenting Problem: sepsis; acute hypoxic respiratory failure; pneumonia; WENDIE; pulmonary edema; hx Breast CA    Problem List  Principal Problem:    Sepsis, due to unspecified org Approx Degree of Impairment: 64.91% has been calculated based on documentation in the [facility] '6 clicks' Inpatient Basic Mobility Short Form. Research supports that patients with this level of impairment may benefit from rehab stay.  Pt with improved strengt Need to walk in hospital room?: A Lot   -   Climbing 3-5 steps with a railing?: Total     AM-PAC Score:  Raw Score: 13   Approx Degree of Impairment: 64.91%   Standardized Score (AM-PAC Scale): 36.74   CMS Modifier (G-Code): CL    FUNCTIONAL ABILITY STATUS

## 2019-07-05 NOTE — PROGRESS NOTES
Hematology/Oncology follow up Note    Overall improving clinical status   Still with diarrhea     Past Medical History:   Diagnosis Date   • Breast cancer [facility])    • Breast injury 2016    left breast    • Essential hypertension    • Fibrocystic breast 2016 Daily   • potassium chloride  40 mEq Oral Daily   • metRONIDAZOLE  500 mg Intravenous Q8H   • vancomycin HCl  125 mg Oral Q6H   • Fluticasone Propionate  1 spray Each Nare Daily   • Metoprolol Succinate ER  25 mg Oral Nightly   • Pantoprazole Sodium  40 mg neutropenic fever    Breast Cancer  - HER2 positive inflammatory disease currently on neoadjuvant chemotherapy  - no chemo while inpatient  - given the significant neutropenic fever and ICU stay, would hold all therapy and proceed with surgery when physica

## 2019-07-05 NOTE — OCCUPATIONAL THERAPY NOTE
OCCUPATIONAL THERAPY TREATMENT NOTE - INPATIENT        Room Number: 217/217-A           Presenting Problem: (sepsis, neutropenia, sepsis /CA)    Problem List  Principal Problem:    Sepsis, due to unspecified organism [facility])  Active Problems:    Fever    Wanda training; Endurance training;Patient/Family education;Patient/Family training;Equipment eval/education; Compensatory technique education    SUBJECTIVE  \"I am just trying to breathe\"    OBJECTIVE  Precautions: (rectal tube, HF O2)    WEIGHT BEARING RESTRICT from bed with min A and mod A x1     Patient will complete toileting with mod a  Comment:  Patient has flexiseal and rivas in place for bowel and bladder    Patient will tolerate standing for 45 seconds to 1 minutes in prep for adls with min a    Comment:

## 2019-07-05 NOTE — PROGRESS NOTES
[facility] Hospitalist Progress Note     CC: Hospital Follow up    PCP: Gus Pierce MD       Assessment/Plan:     Principal Problem:    Sepsis, due to unspecified organism [facility])  Active Problems:    Fever    Neutropenic fever ([address]    Pneumonia of left lower l azithro  - add IV flagyl 6/29 for anaerobic coverage given worsening resp status  - Cdiff also positive - oral vanco since 6/27/19     Neutropenic fever / leucopenia / anemia - improved  - secondary to Chemo  - fever from PNA  - treatment as noted above  - Output 2825 ml   Net -1718 ml       Last 3 Weights  07/04/19 0500 : 186 lb 1.6 oz (84.4 kg)  07/03/19 0600 : 183 lb (83 kg)  07/02/19 0552 : 179 lb 6.4 oz (81.4 kg)  07/01/19 0600 : 182 lb 15.7 oz (83 kg)  06/28/19 0218 : 185 lb 1.6 oz (84 kg)  06/27/19 vancomycin HCl  125 mg Oral Q6H   • Fluticasone Propionate  1 spray Each Nare Daily   • Metoprolol Succinate ER  25 mg Oral Nightly   • Pantoprazole Sodium  40 mg Oral BID AC       Metoclopramide HCl, LORazepam, fentaNYL citrate, ipratropium-albuterol, Nor

## 2019-07-05 NOTE — PROGRESS NOTES
Pulmonary Progress Note     Assessment / Plan:  1.  Acute hypoxic respiratory failure - due to PNA, ARDS, pulmonary edema. Slowly improving  - on Airvo to NC today, wean as tolerated  - IV steroids for severe pneumonia and bronchospasm; tapered today  - abx

## 2019-07-05 NOTE — SIGNIFICANT EVENT
Called by RN at [phone] that pt refusing IV antibiotics and no longer wants medical intervention. She expresses the wish to just stop medical intervention and to die. She denies active SI. She has expressed the same to her son and RN.   Son d/w pt for over Does not want BIPAP, DNR/DNI. Is agreeable to trying morphine. Had rash with codiene but does not remember reaction with morphine.   Will be prn, encouraged to try dose prior to transfer to rehab if that is the course chosen    [address] [facility] con

## 2019-07-06 RX ORDER — PREDNISONE 20 MG/1
20 TABLET ORAL
Status: DISCONTINUED | OUTPATIENT
Start: 2019-07-06 | End: 2019-07-10

## 2019-07-06 NOTE — PLAN OF CARE
Problem: Patient Centered Care  Goal: Patient preferences are identified and integrated in the patient's plan of care  Description  Interventions:  - What would you like us to know as we care for you?   - Provide timely, complete, and accurate informatio needs  - Identify cognitive and physical deficits and behaviors that affect risk of falls.   - [place] fall precautions as indicated by assessment.  - Educate pt/family on patient safety including physical limitations  - Instruct pt to call for assistance output and patient weight  - Monitor urine specific gravity, serum osmolarity and serum sodium as indicated or ordered  - Monitor response to interventions for patient's volume status, including labs, urine output, blood pressure (other measures as availab

## 2019-07-06 NOTE — PLAN OF CARE
Patient alert. Reports no pain. Fall precautions in effect. Pt breathing freely, tolerating O2 3L per NC. VSS.   No acute distress noted  Problem: Patient Centered Care  Goal: Patient preferences are identified and integrated in the patient's plan of ca appropriate  Outcome: Progressing     Problem: SAFETY ADULT - FALL  Goal: Free from fall injury  Description  INTERVENTIONS:  - Assess pt frequently for physical needs  - Identify cognitive and physical deficits and behaviors that affect risk of falls.   - optimal renal function maintained  Description  INTERVENTIONS:  - Monitor labs and assess for signs and symptoms of volume excess or deficit  - Monitor intake, output and patient weight  - Monitor urine specific gravity, serum osmolarity and serum sodium a

## 2019-07-06 NOTE — PROGRESS NOTES
Report called to Morteza RN on 4th floor. Transferred to 472 per transport, per bed. Skin check performed with Rashawn COELHO. No wounds noted. Slight excoriation noted to buttocks and cecilio area. No acute distress noted.

## 2019-07-06 NOTE — PHYSICAL THERAPY NOTE
PHYSICAL THERAPY TREATMENT NOTE - INPATIENT     Room Number: 569/705-K       Presenting Problem: sepsis; acute hypoxic respiratory failure; pneumonia; WENDIE; pulmonary edema; hx Breast CA    Problem List  Principal Problem:    Sepsis, due to unspecified org tested    ACTIVITY TOLERANCE  Pulse: 88                      O2 WALK  SPO2 on Room Air at Rest: 91        Ambulation oxygen flow (liters per minute): (3)      AM-PAC '6-Clicks' INPATIENT SHORT FORM - BASIC MOBILITY  How much difficulty does the patient cur Goal #3   Current Status NT    Goal #4 Patient will negotiate 4 stairs/one curb w/ assistive device and supervision   Goal #4   Current Status Not initiated, unable to tolerate at this time    Goal #5 Patient to demonstrate independence with home activit

## 2019-07-06 NOTE — PROGRESS NOTES
[facility] AND [facility]  Progress Note    Lux Plan Patient Status:  Inpatient    1945 MRN [id number]   Location [facility] 4W/SW/SE Attending Anum Tse MD   [facility] Day [facility] PCP Marcio Kaplan MD     Subjective:  Lux Plan is a(n) she does not want further treatment, wants palliative care/hospice    2. Pneumonia  Had presented with neutropenia/fever, did not receive GCSF with her chemotherapy. Received antibiotics/steroids  Pulmonary following.     3. Leukocytosis  Given GCSF on thi

## 2019-07-06 NOTE — PLAN OF CARE
Problem: Patient/Family Goals  Goal: Patient/Family Long Term Goal  Description  Patient's Long Term Goal: go home     Interventions:  - airvo, vitals, antibiotics   - See additional Care Plan goals for specific interventions   Outcome: Progressing  Goal oxygenation  Description  INTERVENTIONS:  - Assess for changes in respiratory status  - Assess for changes in mentation and behavior  - Position to facilitate oxygenation and minimize respiratory effort  - Oxygen supplementation based on oxygen saturation Refused steroid but agreeable to taking vanco. Call light within reach, using appropriately, bed alarm in use.

## 2019-07-06 NOTE — PROGRESS NOTES
[facility] Pulmonary, Critical Care and Sleep    Crystal Davies Patient Status:  Inpatient    1945 MRN [id number]   Location [facility] 4W/SW/SE Attending Robson Buck MD   Hosp Day # 10 PCP Nguyen Stiles MD       Date of Admission: 2019 CREATSERUM 1.57*  --  1.55* 1.47*   GFRAA 38*  --  38* 41*   GFRNAA 33*  --  33* 35*   CA 9.1  --  9.7 9.1     --  139 140   K 3.2* 3.7 4.7 4.3     --  103 105   CO2 31.0  --  30.0 31.0     No results for input(s): INR, PTT in the last 168 hour

## 2019-07-06 NOTE — CM/SW NOTE
Consult for hospice noted from Dr. Demond Saldana. Pt no longer wants to continue aggressive treatments and wants comfort care. Referral made with [facility] team; [facility] notified. Clinicals sent via [facility] per ISR now. Bedside RN updated.     L

## 2019-07-06 NOTE — PROGRESS NOTES
[facility] Hospitalist Progress Note     CC: Hospital Follow up    PCP: Roland Villarreal MD       Assessment/Plan:     Principal Problem:    Sepsis, due to unspecified organism [facility])  Active Problems:    Fever    Neutropenic fever ([address]    Pneumonia of left lower l declines further labs/work up     Severe Sepsis 2/2 PNA    - on admit: neutropenic / leucopenia, CXR with infiltrate, Fever, LA 4.2  - s/p sepsis bolus, LA cleared  - Blood cultures NGTD, RVP negative, UA negative  - see above, abx stopped at pt request    that stopping the medications, lab draws, vitals, etc will not guarantee she will pass away in the next several days to weeks. She is aware she may improve and life expectancy could be in months to maybe even 1-2 years.   She expresses that she hopes it is 7/6/2019 1355  Last data filed at 7/6/2019 1200  Gross per 24 hour   Intake 902 ml   Output 1650 ml   Net -748 ml       Last 3 Weights  07/04/19 0500 : 186 lb 1.6 oz (84.4 kg)  07/03/19 0600 : 183 lb (83 kg)  07/02/19 0552 : 179 lb 6.4 oz (81.4 kg)  07/01/ furosemide, LORazepam, Morphine Sulfate (Concentrate), Metoclopramide HCl, ipratropium-albuterol, Normal Saline Flush, acetaminophen, ondansetron HCl

## 2019-07-06 NOTE — HOSPICE RN NOTE
[facility] met in the room to discuss hospice with the patient and her son and two nieces present in the room. The patient told me she is ready to die.   \"I am ready to go to nursing place and have Hospice and die\"  After further discussion with

## 2019-07-07 RX ORDER — HEPARIN SODIUM 5000 [USP'U]/ML
5000 INJECTION, SOLUTION INTRAVENOUS; SUBCUTANEOUS EVERY 8 HOURS SCHEDULED
Status: DISCONTINUED | OUTPATIENT
Start: 2019-07-07 | End: 2019-07-12

## 2019-07-07 NOTE — PLAN OF CARE
Problem: Patient Centered Care  Goal: Patient preferences are identified and integrated in the patient's plan of care  Description  Interventions:  - What would you like us to know as we care for you?  Pt would like hospice  - Provide timely, complete, an frequently for physical needs  - Identify cognitive and physical deficits and behaviors that affect risk of falls.   - [place] fall precautions as indicated by assessment.  - Educate pt/family on patient safety including physical limitations  - Instruct p deficit  - Monitor intake, output and patient weight  - Monitor urine specific gravity, serum osmolarity and serum sodium as indicated or ordered  - Monitor response to interventions for patient's volume status, including labs, urine output, blood pressure

## 2019-07-07 NOTE — PROGRESS NOTES
[facility] Hospitalist Progress Note     CC: Hospital Follow up    PCP: Isidoro Grey MD       Assessment/Plan:     Principal Problem:    Sepsis, due to unspecified organism [facility])  Active Problems:    Fever    Neutropenic fever ([address]    Pneumonia of left lower l cleared  - Blood cultures NGTD, RVP negative, UA negative  - see above, abx stopped at pt request, see above     Neutropenic fever / leucopenia / anemia - improved  - secondary to Chemo  - fever from PNA, now resolved   - treatment as noted above  - nidhi (5' 3\"), weight 186 lb 1.6 oz (84.4 kg), SpO2 97 %, not currently breastfeeding.     Temp:  [97.8 °F (36.6 °C)-98.6 °F (37 °C)] 98.2 °F (36.8 °C)  Pulse:  [70-79] 79  Resp:  [18-24] 18  BP: (118-127)/(52-76) 127/68      Intake/Output:    Intake/Output Summ 07/02/19  0443   ALT 29   AST 22   ALB 2.2*         Imaging:          Meds:     • vancomycin HCl  250 mg Oral Q6H   • Heparin Sodium (Porcine)  5,000 Units Subcutaneous Q8H [address]   • predniSONE  20 mg Oral Daily with breakfast   • Metoprolol Succinate ER  25

## 2019-07-08 LAB
ANION GAP SERPL CALC-SCNC: 4 MMOL/L (ref 0–18)
BASOPHILS # BLD AUTO: 0.02 X10(3) UL (ref 0–0.2)
BASOPHILS NFR BLD AUTO: 0.1 %
BUN BLD-MCNC: 54 MG/DL (ref 7–18)
BUN/CREAT SERPL: 49.5 (ref 10–20)
CALCIUM BLD-MCNC: 9.7 MG/DL (ref 8.5–10.1)
CHLORIDE SERPL-SCNC: 105 MMOL/L (ref 98–112)
CO2 SERPL-SCNC: 34 MMOL/L (ref 21–32)
CREAT BLD-MCNC: 1.09 MG/DL (ref 0.55–1.02)
DEPRECATED RDW RBC AUTO: 58.5 FL (ref 35.1–46.3)
EOSINOPHIL # BLD AUTO: 0 X10(3) UL (ref 0–0.7)
EOSINOPHIL NFR BLD AUTO: 0 %
ERYTHROCYTE [DISTWIDTH] IN BLOOD BY AUTOMATED COUNT: 17.2 % (ref 11–15)
GLUCOSE BLD-MCNC: 101 MG/DL (ref 70–99)
HAV IGM SER QL: 2.2 MG/DL (ref 1.6–2.6)
HCT VFR BLD AUTO: 30.1 % (ref 35–48)
HGB BLD-MCNC: 9.7 G/DL (ref 12–16)
IMM GRANULOCYTES # BLD AUTO: 0.17 X10(3) UL (ref 0–1)
IMM GRANULOCYTES NFR BLD: 0.9 %
LYMPHOCYTES # BLD AUTO: 0.66 X10(3) UL (ref 1–4)
LYMPHOCYTES NFR BLD AUTO: 3.6 %
MCH RBC QN AUTO: 30 PG (ref 26–34)
MCHC RBC AUTO-ENTMCNC: 32.2 G/DL (ref 31–37)
MCV RBC AUTO: 93.2 FL (ref 80–100)
MONOCYTES # BLD AUTO: 1.59 X10(3) UL (ref 0.1–1)
MONOCYTES NFR BLD AUTO: 8.8 %
NEUTROPHILS # BLD AUTO: 15.68 X10 (3) UL (ref 1.5–7.7)
NEUTROPHILS # BLD AUTO: 15.68 X10(3) UL (ref 1.5–7.7)
NEUTROPHILS NFR BLD AUTO: 86.6 %
OSMOLALITY SERPL CALC.SUM OF ELEC: 311 MOSM/KG (ref 275–295)
PLATELET # BLD AUTO: 225 10(3)UL (ref 150–450)
POTASSIUM SERPL-SCNC: 5.2 MMOL/L (ref 3.5–5.1)
RBC # BLD AUTO: 3.23 X10(6)UL (ref 3.8–5.3)
SODIUM SERPL-SCNC: 143 MMOL/L (ref 136–145)
WBC # BLD AUTO: 18.1 X10(3) UL (ref 4–11)

## 2019-07-08 PROCEDURE — 99231 SBSQ HOSP IP/OBS SF/LOW 25: CPT | Performed by: REGISTERED NURSE

## 2019-07-08 NOTE — PLAN OF CARE
Flexi seal in place. Purewick in place. 2L NC in place. Ambulates 2 person moderate assist with RW, poor balance. Chest port saline locked. Denies pain. Periarea redness, aloe vesta cream and frequent incontinence care provided.  Bed in lowest position, eileen Monitor for opioid side effects  - Notify MD/LIP if interventions unsuccessful or patient reports new pain  - Anticipate increased pain with activity and pre-medicate as appropriate  Outcome: Progressing     Problem: SAFETY ADULT - FALL  Goal: Free from fa appropriate  - Fluid restriction as ordered  - Instruct patient on fluid and nutrition restrictions as appropriate  Outcome: Progressing  Goal: Hemodynamic stability and optimal renal function maintained  Description  INTERVENTIONS:  - Monitor labs and ass

## 2019-07-08 NOTE — PHYSICAL THERAPY NOTE
PHYSICAL THERAPY TREATMENT NOTE - INPATIENT     Room Number: 274/929-D       Presenting Problem: sepsis; acute hypoxic respiratory failure; pneumonia; WENDIE; pulmonary edema; hx Breast CA    Problem List  Principal Problem:    Sepsis, due to unspecified org Static Sitting: Good  Dynamic Sitting: Fair +           Static Standing: Fair +  Dynamic Standing: Fair    ACTIVITY TOLERANCE                         O2 WALK                  AM-PAC '6-Clicks' INPATIENT SHORT FORM Min assist    Goal #4 Patient will negotiate 4 stairs/one curb w/ assistive device and supervision   Goal #4   Current Status Not initiated, unable to tolerate at this time    Goal #5 Patient to demonstrate independence with home activity/

## 2019-07-08 NOTE — OCCUPATIONAL THERAPY NOTE
OCCUPATIONAL THERAPY TREATMENT NOTE - INPATIENT        Room Number: 964/269-S           Presenting Problem: sepsis, PNA    Problem List  Principal Problem:    Sepsis, due to unspecified organism [facility])  Active Problems:    Fever    Neutropenic fever ([facility] [address] RESTRICTION  Weight Bearing Restriction: None                PAIN ASSESSMENT  Rating: Other (Comment)(soreness at flexiseal sight)  Location: rectum  Management Techniques: Repositioning     ACTIVITY TOLERANCE                         O2 SATURATIONS tolerate standing for 45 seconds to 1 minutes in prep for adls with min a    Comment: ~30 seconds WITH MIN A X1 and RW    patient will tolerate sitting edge of bed 10 min with least of amount of breathing support sba to improve endurance for ADL tasks   Co

## 2019-07-08 NOTE — PALLIATIVE CARE NOTE
[facility]  Palliative Care Follow Up    Carmen Haddad Patient Status:  Inpatient    1945 MRN [id number]   Location [facility] 2W/ Attending Saturnino Echevarria MD   [facility] Day # 15 PCP Komal Garber MD     Date of Consult: 2019 250 mg, Oral, Q6H  •  Heparin Sodium (Porcine) 5000 UNIT/ML injection 5,000 Units, 5,000 Units, Subcutaneous, Q8H AMAYA  •  predniSONE (DELTASONE) tab 20 mg, 20 mg, Oral, Daily with breakfast  •  furosemide (LASIX) injection 20 mg, 20 mg, Intravenous, BID NE and in no apparent distress. Weak, chronically-ill appearing  HEENT: No focal deficits. Cardiac: Regular rate and rhythm, S1, S2 normal, no murmur, rub or gallop. Lungs: Diminished bilaterally, no wheezing. Normal excursions and effort.   Abdomen: Soft, n help with dc planning  -Agreeable to palliative care following  -Provided emotional support to pt who is coping adequately    Advance care planning  -Pt says her son Jaci Brooks is her HCPOA #[phone]  -Completed POLST on file in [facility].

## 2019-07-08 NOTE — PROGRESS NOTES
Pulmonary Progress Note     Assessment / Plan:  1. Acute hypoxic respiratory failure - due to PNA, ARDS, pulmonary edema. Slowly improving  - O2 wean as tolerated  - off IV steroids. Short prednisone taper   - abx as below  - continue IV lasix prn  2.  Delma

## 2019-07-08 NOTE — PROGRESS NOTES
Hematology/Oncology follow up Note    Overall improving clinical status   She is on regular floors now   Still with diarrhea, vanc increased yesterday     Past Medical History:   Diagnosis Date   • Breast cancer ([facility] [address]    • Breast injury 2016    left breast Metoprolol Succinate ER  25 mg Oral Nightly   • Pantoprazole Sodium  40 mg Oral BID AC       ALLERGIES:   Adhesive Tape           RASH    Comment:Cloth tape only, plastic okay  Bactrim [Sulfametho*      Codeine Phosphate       RASH  Opioid Analgesics chemotherapy  - given the significant neutropenic fever and ICU stay, would hold all therapy and proceed with surgery when physically able   - agree with ALPHONSE for increasing strength     Neutropenic fever  - did not receive neulasta  - neupogen added in the

## 2019-07-08 NOTE — PROGRESS NOTES
[facility] Hospitalist Progress Note     CC: Hospital Follow up    PCP: Spencer Eller MD       Assessment/Plan:     Principal Problem:    Sepsis, due to unspecified organism [facility])  Active Problems:    Fever    Neutropenic fever ([address]    Pneumonia of left lower l Sepsis 2/2 PNA    - on admit: neutropenic / leucopenia, CXR with infiltrate, Fever, LA 4.2  - s/p sepsis bolus, LA cleared  - Blood cultures NGTD, RVP negative, UA negative  - see above, abx stopped at pt request, see above     Neutropenic fever / leucopen 186 lb 1.6 oz (84.4 kg), SpO2 95 %, not currently breastfeeding.     Temp:  [97.6 °F (36.4 °C)-98.4 °F (36.9 °C)] 98.2 °F (36.8 °C)  Pulse:  [66-81] 66  Resp:  [18] 18  BP: (104-128)/(59-71) 113/59      Intake/Output:    Intake/Output Summary (Last 24 hours vancomycin HCl  250 mg Oral Q6H   • Heparin Sodium (Porcine)  5,000 Units Subcutaneous Q8H [facility]   • predniSONE  20 mg Oral Daily with breakfast   • Metoprolol Succinate ER  25 mg Oral Nightly   • Pantoprazole Sodium  40 mg Oral BID AC       furosemide, [place]

## 2019-07-09 LAB
ANION GAP SERPL CALC-SCNC: 5 MMOL/L (ref 0–18)
BASOPHILS # BLD AUTO: 0.02 X10(3) UL (ref 0–0.2)
BASOPHILS NFR BLD AUTO: 0.1 %
BUN BLD-MCNC: 46 MG/DL (ref 7–18)
BUN/CREAT SERPL: 44.7 (ref 10–20)
CALCIUM BLD-MCNC: 9.3 MG/DL (ref 8.5–10.1)
CHLORIDE SERPL-SCNC: 103 MMOL/L (ref 98–112)
CO2 SERPL-SCNC: 34 MMOL/L (ref 21–32)
CREAT BLD-MCNC: 1.03 MG/DL (ref 0.55–1.02)
DEPRECATED RDW RBC AUTO: 58.9 FL (ref 35.1–46.3)
EOSINOPHIL # BLD AUTO: 0.05 X10(3) UL (ref 0–0.7)
EOSINOPHIL NFR BLD AUTO: 0.3 %
ERYTHROCYTE [DISTWIDTH] IN BLOOD BY AUTOMATED COUNT: 17.3 % (ref 11–15)
GLUCOSE BLD-MCNC: 88 MG/DL (ref 70–99)
HCT VFR BLD AUTO: 27 % (ref 35–48)
HGB BLD-MCNC: 8.8 G/DL (ref 12–16)
IMM GRANULOCYTES # BLD AUTO: 0.13 X10(3) UL (ref 0–1)
IMM GRANULOCYTES NFR BLD: 0.9 %
LYMPHOCYTES # BLD AUTO: 0.56 X10(3) UL (ref 1–4)
LYMPHOCYTES NFR BLD AUTO: 3.7 %
MCH RBC QN AUTO: 30.3 PG (ref 26–34)
MCHC RBC AUTO-ENTMCNC: 32.6 G/DL (ref 31–37)
MCV RBC AUTO: 93.1 FL (ref 80–100)
MONOCYTES # BLD AUTO: 1.37 X10(3) UL (ref 0.1–1)
MONOCYTES NFR BLD AUTO: 9.1 %
NEUTROPHILS # BLD AUTO: 12.98 X10 (3) UL (ref 1.5–7.7)
NEUTROPHILS # BLD AUTO: 12.98 X10(3) UL (ref 1.5–7.7)
NEUTROPHILS NFR BLD AUTO: 85.9 %
OSMOLALITY SERPL CALC.SUM OF ELEC: 305 MOSM/KG (ref 275–295)
PLATELET # BLD AUTO: 201 10(3)UL (ref 150–450)
POTASSIUM SERPL-SCNC: 4.3 MMOL/L (ref 3.5–5.1)
RBC # BLD AUTO: 2.9 X10(6)UL (ref 3.8–5.3)
SODIUM SERPL-SCNC: 142 MMOL/L (ref 136–145)
WBC # BLD AUTO: 15.1 X10(3) UL (ref 4–11)

## 2019-07-09 PROCEDURE — 99231 SBSQ HOSP IP/OBS SF/LOW 25: CPT | Performed by: REGISTERED NURSE

## 2019-07-09 NOTE — PROGRESS NOTES
[facility] Hospitalist Progress Note     CC: Hospital Follow up    PCP: Elvia Turner MD       Assessment/Plan:     Principal Problem:    Sepsis, due to unspecified organism [facility])  Active Problems:    Fever    Neutropenic fever ([address]    Pneumonia of left lower l improved  - secondary to Chemo  - fever from PNA, now resolved   - treatment as noted above  - filgastrim given 6/27  - onc following  - leukopenia on admission, wbc up to 25 on 7/4/19 (2/2 filgastrim/steroids likely) and down to 20.9 on 7/5/19 and now dec lb 1.6 oz (84.4 kg)  07/03/19 0600 : 183 lb (83 kg)  07/02/19 0552 : 179 lb 6.4 oz (81.4 kg)  07/01/19 0600 : 182 lb 15.7 oz (83 kg)  06/28/19 0218 : 185 lb 1.6 oz (84 kg)  06/27/19 0605 : 185 lb 11.2 oz (84.2 kg)  06/27/19 0345 : 180 lb (81.6 kg)  06/26/1 Flush, acetaminophen, ondansetron HCl

## 2019-07-09 NOTE — PLAN OF CARE
Problem: Patient Centered Care  Goal: Patient preferences are identified and integrated in the patient's plan of care  Description  Interventions:  - What would you like us to know as we care for you?  I'm trying to be more  positive  - Provide timely, co pt frequently for physical needs  - Identify cognitive and physical deficits and behaviors that affect risk of falls.   - [place] fall precautions as indicated by assessment.  - Educate pt/family on patient safety including physical limitations  - Instruc deficit  - Monitor intake, output and patient weight  - Monitor urine specific gravity, serum osmolarity and serum sodium as indicated or ordered  - Monitor response to interventions for patient's volume status, including labs, urine output, blood pressure

## 2019-07-09 NOTE — CM/SW NOTE
Updated information has been sent to [facility] today 7/9. Plan will be [facility] with [facility] when medically stable.       Lawyer Medina [facility] ext [phone]

## 2019-07-09 NOTE — PLAN OF CARE
Problem: Patient Centered Care  Goal: Patient preferences are identified and integrated in the patient's plan of care  Description  Interventions:  - What would you like us to know as we care for you?  I'm trying to be more  positive  - Provide timely, co pt frequently for physical needs  - Identify cognitive and physical deficits and behaviors that affect risk of falls.   - [place] fall precautions as indicated by assessment.  - Educate pt/family on patient safety including physical limitations  - Instruc deficit  - Monitor intake, output and patient weight  - Monitor urine specific gravity, serum osmolarity and serum sodium as indicated or ordered  - Monitor response to interventions for patient's volume status, including labs, urine output, blood pressure

## 2019-07-09 NOTE — PHYSICAL THERAPY NOTE
Attempted physical therapy treatment. Patient declined stating she wanted to rest, they just removed flexiseal. Educated patient on seated lower extremity exercises, patient with good carryover. Will re-attempt as patient is available.  LYLY Garcia aware of

## 2019-07-09 NOTE — PALLIATIVE CARE NOTE
[facility]  Palliative Care Follow Up    Carlota Davis Patient Status:  Inpatient    1945 MRN [id number]   Location [facility] 2W/SW Attending Della Larsen MD   [facility] Day # 15 PCP Jovani Colvin MD     Date of Consult: 2019 mL, 3 mL, Intravenous, PRN  •  acetaminophen (TYLENOL) tab 650 mg, 650 mg, Oral, Q6H PRN  •  ondansetron HCl (ZOFRAN) injection 4 mg, 4 mg, Intravenous, Q6H PRN    No current outpatient medications on file.     Hematology:  Lab Results   Component Value Jaiden discussed with patient or healthcare power of : Yes        Healthcare Agent Appointed: Yes  Healthcare Agent's Name: Shruthi Chase (son)  Healthcare Agent's Phone Number: [phone]          Spiritual needs addressed: Unable to assess    Dispositio

## 2019-07-09 NOTE — PROGRESS NOTES
Hematology/Oncology follow up Note    Overall improving clinical status   Diarrhea is major issue  O2 requirements with significant improvement     Past Medical History:   Diagnosis Date   • Breast cancer [facility])    • Breast injury 2016    left breast    • E Metoprolol Succinate ER  25 mg Oral Nightly   • Pantoprazole Sodium  40 mg Oral BID AC       ALLERGIES:   Adhesive Tape           RASH    Comment:Cloth tape only, plastic okay  Bactrim [Sulfametho*      Codeine Phosphate       RASH  Opioid Analgesics Pneumonia/ARDS/pneumonitis  - off abx   - 02 requirements improving now down to [facility]  - on short steroid taper per pulm     Goals of care   - appreciate palliative care recommendations and involvement in this case   - patient still wishes to be DNR/DNI

## 2019-07-10 LAB
ANION GAP SERPL CALC-SCNC: 7 MMOL/L (ref 0–18)
ANTIBODY SCREEN: NEGATIVE
BASOPHILS # BLD AUTO: 0.01 X10(3) UL (ref 0–0.2)
BASOPHILS # BLD AUTO: 0.01 X10(3) UL (ref 0–0.2)
BASOPHILS NFR BLD AUTO: 0.1 %
BASOPHILS NFR BLD AUTO: 0.1 %
BUN BLD-MCNC: 36 MG/DL (ref 7–18)
BUN/CREAT SERPL: 51.4 (ref 10–20)
CALCIUM BLD-MCNC: 7.4 MG/DL (ref 8.5–10.1)
CHLORIDE SERPL-SCNC: 112 MMOL/L (ref 98–112)
CO2 SERPL-SCNC: 26 MMOL/L (ref 21–32)
CREAT BLD-MCNC: 0.7 MG/DL (ref 0.55–1.02)
DEPRECATED HBV CORE AB SER IA-ACNC: 1495 NG/ML (ref 18–340)
DEPRECATED RDW RBC AUTO: 58.8 FL (ref 35.1–46.3)
DEPRECATED RDW RBC AUTO: 59.4 FL (ref 35.1–46.3)
EOSINOPHIL # BLD AUTO: 0.03 X10(3) UL (ref 0–0.7)
EOSINOPHIL # BLD AUTO: 0.03 X10(3) UL (ref 0–0.7)
EOSINOPHIL NFR BLD AUTO: 0.2 %
EOSINOPHIL NFR BLD AUTO: 0.4 %
ERYTHROCYTE [DISTWIDTH] IN BLOOD BY AUTOMATED COUNT: 17.2 % (ref 11–15)
ERYTHROCYTE [DISTWIDTH] IN BLOOD BY AUTOMATED COUNT: 17.3 % (ref 11–15)
FOLATE SERPL-MCNC: 8.7 NG/ML (ref 8.7–?)
GLUCOSE BLD-MCNC: 63 MG/DL (ref 70–99)
GLUCOSE BLDC GLUCOMTR-MCNC: 104 MG/DL (ref 70–99)
HAPTOGLOB SERPL-MCNC: 245 MG/DL (ref 30–200)
HCT VFR BLD AUTO: 21.1 % (ref 35–48)
HCT VFR BLD AUTO: 28.8 % (ref 35–48)
HGB BLD-MCNC: 6.9 G/DL (ref 12–16)
HGB BLD-MCNC: 9.2 G/DL (ref 12–16)
IMM GRANULOCYTES # BLD AUTO: 0.06 X10(3) UL (ref 0–1)
IMM GRANULOCYTES # BLD AUTO: 0.09 X10(3) UL (ref 0–1)
IMM GRANULOCYTES NFR BLD: 0.6 %
IMM GRANULOCYTES NFR BLD: 0.7 %
IRON SATURATION: 57 % (ref 15–50)
IRON SERPL-MCNC: 133 UG/DL (ref 50–170)
LDH SERPL L TO P-CCNC: 312 U/L (ref 84–246)
LYMPHOCYTES # BLD AUTO: 0.39 X10(3) UL (ref 1–4)
LYMPHOCYTES # BLD AUTO: 0.51 X10(3) UL (ref 1–4)
LYMPHOCYTES NFR BLD AUTO: 2.8 %
LYMPHOCYTES NFR BLD AUTO: 6.3 %
MCH RBC QN AUTO: 29.9 PG (ref 26–34)
MCH RBC QN AUTO: 30.5 PG (ref 26–34)
MCHC RBC AUTO-ENTMCNC: 31.9 G/DL (ref 31–37)
MCHC RBC AUTO-ENTMCNC: 32.7 G/DL (ref 31–37)
MCV RBC AUTO: 93.4 FL (ref 80–100)
MCV RBC AUTO: 93.5 FL (ref 80–100)
MONOCYTES # BLD AUTO: 0.64 X10(3) UL (ref 0.1–1)
MONOCYTES # BLD AUTO: 1.2 X10(3) UL (ref 0.1–1)
MONOCYTES NFR BLD AUTO: 7.9 %
MONOCYTES NFR BLD AUTO: 8.5 %
NEUTROPHILS # BLD AUTO: 12.43 X10 (3) UL (ref 1.5–7.7)
NEUTROPHILS # BLD AUTO: 12.43 X10(3) UL (ref 1.5–7.7)
NEUTROPHILS # BLD AUTO: 6.85 X10 (3) UL (ref 1.5–7.7)
NEUTROPHILS # BLD AUTO: 6.85 X10(3) UL (ref 1.5–7.7)
NEUTROPHILS NFR BLD AUTO: 84.6 %
NEUTROPHILS NFR BLD AUTO: 87.8 %
OSMOLALITY SERPL CALC.SUM OF ELEC: 306 MOSM/KG (ref 275–295)
PLATELET # BLD AUTO: 214 10(3)UL (ref 150–450)
PLATELET # BLD AUTO: 40 10(3)UL (ref 150–450)
POTASSIUM SERPL-SCNC: 3.4 MMOL/L (ref 3.5–5.1)
RBC # BLD AUTO: 2.26 X10(6)UL (ref 3.8–5.3)
RBC # BLD AUTO: 3.08 X10(6)UL (ref 3.8–5.3)
RH BLOOD TYPE: POSITIVE
SODIUM SERPL-SCNC: 145 MMOL/L (ref 136–145)
TOTAL IRON BINDING CAPACITY: 235 UG/DL (ref 240–450)
TRANSFERRIN SERPL-MCNC: 158 MG/DL (ref 200–360)
VIT B12 SERPL-MCNC: 1496 PG/ML (ref 193–986)
WBC # BLD AUTO: 14.2 X10(3) UL (ref 4–11)
WBC # BLD AUTO: 8.1 X10(3) UL (ref 4–11)

## 2019-07-10 RX ORDER — FUROSEMIDE 10 MG/ML
20 INJECTION INTRAMUSCULAR; INTRAVENOUS ONCE
Status: COMPLETED | OUTPATIENT
Start: 2019-07-10 | End: 2019-07-10

## 2019-07-10 NOTE — PLAN OF CARE
Isis Loraine states she feels \"better\" today. Tolerating diet. Up to chair with PT, floor staff. Diarrhea has been less today per pt's report. Vitals stable. Potential plan for discharge tomorrow.    Problem: Patient Centered Care  Goal: Patient preferences are Anticipate increased pain with activity and pre-medicate as appropriate  Outcome: Progressing     Problem: SAFETY ADULT - FALL  Goal: Free from fall injury  Description  INTERVENTIONS:  - Assess pt frequently for physical needs  - Identify cognitive and ph appropriate  Outcome: Progressing  Goal: Hemodynamic stability and optimal renal function maintained  Description  INTERVENTIONS:  - Monitor labs and assess for signs and symptoms of volume excess or deficit  - Monitor intake, output and patient weight  -

## 2019-07-10 NOTE — PROGRESS NOTES
Hematology/Oncology follow up Note    Rectal tube removed  Able to control movements     Past Medical History:   Diagnosis Date   • Breast cancer [facility])    • Breast injury 2016    left breast    • Essential hypertension    • Fibrocystic breast 2016    left 40 mg Oral BID AC       ALLERGIES:   Adhesive Tape           RASH    Comment:Cloth tape only, plastic okay  Bactrim [Sulfametho*      Codeine Phosphate       RASH  Opioid Analgesics         Penicillins             RASH  Doxycycline             RASH    Phy short steroid taper per pulm     Goals of care   - appreciate palliative care recommendations and involvement in this case   - patient still wishes to be DNR/DNI/noBiPAP should she decline     Diarrhea secondary to c.diff   - still with diarrhea   - oral v

## 2019-07-10 NOTE — PLAN OF CARE
Patient continues to be incontinent of urine and bowel, prompt incontinence care provided. PO vanco given. No complaints of pain. On 2L O2. Up with walker and assist. General diet, tolerating well but poor appetite. Will continue to monitor.      Problem: P interventions unsuccessful or patient reports new pain  - Anticipate increased pain with activity and pre-medicate as appropriate  Outcome: Progressing     Problem: SAFETY ADULT - FALL  Goal: Free from fall injury  Description  INTERVENTIONS:  - Assess pt Instruct patient on fluid and nutrition restrictions as appropriate  Outcome: Progressing  Goal: Hemodynamic stability and optimal renal function maintained  Description  INTERVENTIONS:  - Monitor labs and assess for signs and symptoms of volume excess or

## 2019-07-10 NOTE — OCCUPATIONAL THERAPY NOTE
OCCUPATIONAL THERAPY TREATMENT NOTE - INPATIENT        Room Number: 274/068-G           Presenting Problem: sepsis, PNA    Problem List  Principal Problem:    Sepsis, due to unspecified organism [facility])  Active Problems:    Fever    [facility] rectum  Management Techniques: Repositioning     ACTIVITY TOLERANCE                         O2 SATURATIONS                ACTIVITIES OF DAILY LIVING ASSESSMENT  AM-PAC ‘6-Clicks’ Inpatient Daily Activity Short Form  How much help from another person does t with seated rest break    patient will tolerate sitting edge of bed 10 min with least of amount of breathing support sba to improve endurance for ADL tasks   pt was up in chair this date            Goals  on: 7/15

## 2019-07-10 NOTE — PHYSICAL THERAPY NOTE
PHYSICAL THERAPY TREATMENT NOTE - INPATIENT     Room Number: 496/437-V       Presenting Problem: sepsis; acute hypoxic respiratory failure; pneumonia; WENDIE; pulmonary edema; hx Breast CA    Problem List  Principal Problem:    Sepsis, due to unspecified org sub-acute rehab to optimize functional outcomes. DISCHARGE RECOMMENDATIONS  PT Discharge Recommendations: Sub-acute rehabilitation     PLAN  PT Treatment Plan: Bed mobility; Patient education;Transfer training;Strengthening    SUBJECTIVE  \"I'm still sor pumps  LAQ     Position Sitting       Patient End of Session: Up in chair;Needs met;Call light within reach;RN aware of session/findings    CURRENT GOALS   Goals to be met by: 7/16/19  Patient Goal Patient's self-stated goal is: not stated   Goal #1 Bre

## 2019-07-10 NOTE — PROGRESS NOTES
[facility] Hospitalist Progress Note     CC: Hospital Follow up    PCP: Albert Warren MD       Assessment/Plan:     Principal Problem:    Sepsis, due to unspecified organism [facility])  Active Problems:    Fever    Neutropenic fever ([address]    Pneumonia of left lower l treatment as noted above  - filgastrim given 6/27  - onc following  - leukopenia on admission, wbc up to 25 on 7/4/19 (2/2 filgastrim/steroids likely) and down to 20.9 on 7/5/19 and now decreased to 18, cont to monitor   - hgb and plts stable        Cdiff oz (84.4 kg)  07/03/19 0600 : 183 lb (83 kg)  07/02/19 0552 : 179 lb 6.4 oz (81.4 kg)  07/01/19 0600 : 182 lb 15.7 oz (83 kg)  06/28/19 0218 : 185 lb 1.6 oz (84 kg)  06/27/19 0605 : 185 lb 11.2 oz (84.2 kg)  06/27/19 0345 : 180 lb (81.6 kg)  06/26/19 5928

## 2019-07-10 NOTE — PROGRESS NOTES
Pulmonary Progress Note     Assessment / Plan:  1. Acute hypoxic respiratory failure - due to PNA, ARDS, pulmonary edema. Improved  - wean O2 as tolerated  - dc steroids  - abx as below  - diuresis prn  2.  Severe sepsis - resolved  - lactic acid normalized

## 2019-07-11 ENCOUNTER — APPOINTMENT (OUTPATIENT)
Dept: CT IMAGING | Facility: HOSPITAL | Age: 74
DRG: 871 | End: 2019-07-11
Attending: HOSPITALIST
Payer: MEDICARE

## 2019-07-11 LAB
ANION GAP SERPL CALC-SCNC: 3 MMOL/L (ref 0–18)
BASOPHILS # BLD AUTO: 0.02 X10(3) UL (ref 0–0.2)
BASOPHILS NFR BLD AUTO: 0.2 %
BUN BLD-MCNC: 36 MG/DL (ref 7–18)
BUN/CREAT SERPL: 37.5 (ref 10–20)
CALCIUM BLD-MCNC: 9.3 MG/DL (ref 8.5–10.1)
CHLORIDE SERPL-SCNC: 103 MMOL/L (ref 98–112)
CHOLEST SMN-MCNC: 164 MG/DL (ref ?–200)
CO2 SERPL-SCNC: 35 MMOL/L (ref 21–32)
CREAT BLD-MCNC: 0.96 MG/DL (ref 0.55–1.02)
DEPRECATED RDW RBC AUTO: 57.9 FL (ref 35.1–46.3)
EOSINOPHIL # BLD AUTO: 0.07 X10(3) UL (ref 0–0.7)
EOSINOPHIL NFR BLD AUTO: 0.5 %
ERYTHROCYTE [DISTWIDTH] IN BLOOD BY AUTOMATED COUNT: 17.1 % (ref 11–15)
GLUCOSE BLD-MCNC: 91 MG/DL (ref 70–99)
HAPTOGLOB SERPL-MCNC: 230 MG/DL (ref 30–200)
HAV IGM SER QL: 2 MG/DL (ref 1.6–2.6)
HCT VFR BLD AUTO: 26.9 % (ref 35–48)
HDLC SERPL-MCNC: 54 MG/DL (ref 40–59)
HGB BLD-MCNC: 8.7 G/DL (ref 12–16)
IMM GRANULOCYTES # BLD AUTO: 0.09 X10(3) UL (ref 0–1)
IMM GRANULOCYTES NFR BLD: 0.7 %
LDLC SERPL CALC-MCNC: 81 MG/DL (ref ?–100)
LYMPHOCYTES # BLD AUTO: 0.67 X10(3) UL (ref 1–4)
LYMPHOCYTES NFR BLD AUTO: 5.1 %
MCH RBC QN AUTO: 30 PG (ref 26–34)
MCHC RBC AUTO-ENTMCNC: 32.3 G/DL (ref 31–37)
MCV RBC AUTO: 92.8 FL (ref 80–100)
MONOCYTES # BLD AUTO: 1.72 X10(3) UL (ref 0.1–1)
MONOCYTES NFR BLD AUTO: 13.1 %
NEUTROPHILS # BLD AUTO: 10.61 X10 (3) UL (ref 1.5–7.7)
NEUTROPHILS # BLD AUTO: 10.61 X10(3) UL (ref 1.5–7.7)
NEUTROPHILS NFR BLD AUTO: 80.4 %
NONHDLC SERPL-MCNC: 110 MG/DL (ref ?–130)
OSMOLALITY SERPL CALC.SUM OF ELEC: 300 MOSM/KG (ref 275–295)
PLATELET # BLD AUTO: 199 10(3)UL (ref 150–450)
POTASSIUM SERPL-SCNC: 4.6 MMOL/L (ref 3.5–5.1)
RBC # BLD AUTO: 2.9 X10(6)UL (ref 3.8–5.3)
SODIUM SERPL-SCNC: 141 MMOL/L (ref 136–145)
TRIGL SERPL-MCNC: 145 MG/DL (ref 30–149)
TROPONIN I SERPL-MCNC: 0.06 NG/ML (ref ?–0.04)
TROPONIN I SERPL-MCNC: 0.07 NG/ML (ref ?–0.04)
VLDLC SERPL CALC-MCNC: 29 MG/DL (ref 0–30)
WBC # BLD AUTO: 13.2 X10(3) UL (ref 4–11)

## 2019-07-11 PROCEDURE — 71260 CT THORAX DX C+: CPT | Performed by: HOSPITALIST

## 2019-07-11 PROCEDURE — 90792 PSYCH DIAG EVAL W/MED SRVCS: CPT | Performed by: OTHER

## 2019-07-11 RX ORDER — ESCITALOPRAM OXALATE 10 MG/1
5 TABLET ORAL NIGHTLY
Status: DISCONTINUED | OUTPATIENT
Start: 2019-07-11 | End: 2019-07-12

## 2019-07-11 RX ORDER — ASPIRIN 325 MG
325 TABLET, DELAYED RELEASE (ENTERIC COATED) ORAL DAILY
Status: DISCONTINUED | OUTPATIENT
Start: 2019-07-11 | End: 2019-07-12

## 2019-07-11 RX ORDER — ASPIRIN 81 MG/1
81 TABLET ORAL DAILY
Status: DISCONTINUED | OUTPATIENT
Start: 2019-07-11 | End: 2019-07-11

## 2019-07-11 RX ORDER — LORAZEPAM 0.5 MG/1
0.5 TABLET ORAL 2 TIMES DAILY PRN
Qty: 5 TABLET | Refills: 1 | Status: SHIPPED | OUTPATIENT
Start: 2019-07-11 | End: 2019-08-01

## 2019-07-11 RX ORDER — ACETAMINOPHEN 325 MG/1
650 TABLET ORAL EVERY 6 HOURS PRN
Qty: 1 TABLET | Refills: 0 | Status: SHIPPED | OUTPATIENT
Start: 2019-07-11 | End: 2019-09-20

## 2019-07-11 RX ORDER — CLONAZEPAM 0.5 MG/1
0.5 TABLET ORAL NIGHTLY
Qty: 10 TABLET | Refills: 0 | Status: SHIPPED | OUTPATIENT
Start: 2019-07-11 | End: 2019-08-01

## 2019-07-11 RX ORDER — FUROSEMIDE 10 MG/ML
20 INJECTION INTRAMUSCULAR; INTRAVENOUS ONCE
Status: COMPLETED | OUTPATIENT
Start: 2019-07-11 | End: 2019-07-11

## 2019-07-11 RX ORDER — ESCITALOPRAM OXALATE 5 MG/1
TABLET ORAL
Qty: 67 TABLET | Refills: 0 | Status: SHIPPED | OUTPATIENT
Start: 2019-07-11 | End: 2019-08-01

## 2019-07-11 RX ORDER — CLONAZEPAM 0.5 MG/1
0.5 TABLET ORAL NIGHTLY
Status: DISCONTINUED | OUTPATIENT
Start: 2019-07-11 | End: 2019-07-12

## 2019-07-11 RX ORDER — FUROSEMIDE 20 MG/1
20 TABLET ORAL DAILY
Qty: 3 TABLET | Refills: 0 | Status: SHIPPED | OUTPATIENT
Start: 2019-07-11 | End: 2019-07-12

## 2019-07-11 RX ORDER — FUROSEMIDE 10 MG/ML
20 INJECTION INTRAMUSCULAR; INTRAVENOUS ONCE
Status: COMPLETED | OUTPATIENT
Start: 2019-07-12 | End: 2019-07-12

## 2019-07-11 NOTE — CONSULTS
[facility]    Report of Consultation    Emily Frikristie Patient Status:  Inpatient    1945 MRN [id number]   Location [facility] 4W/SW/SE Attending Gilda Landa MD   [facility] Day # 15 PCP Luis Jimenez MD     Date of Admission:  6 Other (Other) Mother    • Other (Other) Other         muscular dystrophy 2  genetic   • Heart Disease Sister 72   • Colon Cancer Maternal Grandmother         colon cancer   • Cancer Maternal Grandmother         colon   • Breast Cancer Neg    • Thyroid dise temperature 98.5 °F (36.9 °C), temperature source Oral, resp. rate 20, height 63\", weight 186 lb 1.6 oz (84.4 kg), SpO2 96 %, not currently breastfeeding. General: Alert, orientated x3. Cooperative. No apparent distress. HEENT: Exam is unremarkable. upper-outer quadrant of left breast in female, estrogen receptor positive ([address]     Inflammatory breast cancer, left (HCC)     HER2-positive carcinoma of breast ([address]     Fever     Sepsis, due to unspecified organism ([address]     Neutropenic fever ([address]     P

## 2019-07-11 NOTE — CONSULTS
[facility]    PATIENT'S NAME: Joe MARTINEZ   ATTENDING PHYSICIAN: Julien De Leon MD   CONSULTING PHYSICIAN: Pattie Barton MD   PATIENT ACCOUNT#:   [id number]    LOCATION:  [address] [place] [address] #:   [id number]       DATE OF BIRTH: diarrhea. SOCIAL HISTORY:  She is a nonsmoker. FAMILY HISTORY:  No history of premature coronary artery disease. REVIEW OF SYSTEMS:  There have been fevers, sepsis during this admission. Currently, she denies any changes in vision or hearing.   Sh sepsis. 7.   Breast cancer. 8.   Thoracic aortic aneurysm, with CT of the chest in 2018 measuring 4.0 cm in diameter. PLAN:    1. Await repeat troponin. 2.   EKGs reviewed. 3.   Discussed with Dr. Katia Ibanez. 4.   Check echo Doppler.   If echo Doppler d

## 2019-07-11 NOTE — CONSULTS
[facility]    Report of Consultation    Crystal Keke Patient Status:  Inpatient    1945 MRN [id number]   Location [facility] 4W/SW/SE Attending Margaret Amos MD   [facility] Day # 13 PCP Nguyen Stiles MD     Date of Admission:  6 that she declined in following up on majority of her hobbies and she is to enjoy some TV show but that all to stop. Patient admitted that she has becoming irritable restless and disconnected and that is not her.   Patient reported that she is usually very no dm   • OTHER SURGICAL HISTORY      urethra polyp    • OTHER SURGICAL HISTORY      colposcopy ascus 2006 fibroids dx   • TONSILLECTOMY         Family History  Family History   Problem Relation Age of Onset   • Other (Other) Father          [de-identified] mouth every 6 (six) hours as needed for Nausea. Every 6 hours prn nausea or as instructed by physician   Ondansetron HCl (ZOFRAN) 8 MG tablet Take 1 tablet (8 mg total) by mouth every 8 (eight) hours as needed for Nausea.    LORazepam 0.5 MG Oral Tab Take 1 presented calm and cooperative somewhat restless and anxious otherwise. The patient was talkative expressing her emotion appropriately with reliable information.   The patient reported that she has noticing some change in her demeanor and behavior with dec Platelet      Lactic Acid, Plasma      Lactic Acid 3 Hr Post Positive      Scan slide      MD BLOOD SMEAR CONSULT      Hepatic Function Panel (7)      Manual differential      CBC With Differential With Platelet      Comp Metabolic Panel (14)      Magnesiu Screen by PCR Once      Meds This Visit:  Requested Prescriptions     Signed Prescriptions Disp Refills   • vancomycin HCl 50 MG/ML Oral Recon Soln 105 mL 0     Sig: Take 2.5 mL (125 mg total) by mouth 2 (two) times daily for 14 days, THEN 2.5 mL (125 mg t

## 2019-07-11 NOTE — CM/SW NOTE
7/12 149pm: The pt's discharge was held yesterday 7/11. The pt. Is scheduled to discharge to [address] [place] [address] 7/12 at 330p, via [employer]. The pt. Will be billed for the medicar.    ------------------------------------  7/11 340pm: The pt.  Is s

## 2019-07-11 NOTE — PLAN OF CARE
Problem: Patient Centered Care  Goal: Patient preferences are identified and integrated in the patient's plan of care  Description  Interventions:  - What would you like us to know as we care for you?  I'm trying to be more  positive  - Provide timely, co pt frequently for physical needs  - Identify cognitive and physical deficits and behaviors that affect risk of falls.   - [facility] fall precautions as indicated by assessment.  - Educate pt/family on patient safety including physical limitations  - Instruc deficit  - Monitor intake, output and patient weight  - Monitor urine specific gravity, serum osmolarity and serum sodium as indicated or ordered  - Monitor response to interventions for patient's volume status, including labs, urine output, blood pressure

## 2019-07-11 NOTE — PROGRESS NOTES
[facility] Hospitalist Progress Note     CC: Hospital Follow up    PCP: Maverick Reyes MD       Assessment/Plan:     Principal Problem:    Sepsis, due to unspecified organism [facility])  Active Problems:    Fever    Neutropenic fever ([address]    Pneumonia of left lower l to PNA, poss pulm edema component, with ARDS  - repeat CXR 6/29 with worsening edema/consolidation, but repeat 7/1 much improved  -weaned off vapotherm, per pt would not want again, does not want BIPAP, DNR/DNI  - pulm following, appreciate assistance   - with palpation. OBJECTIVE:    Blood pressure 101/64, pulse 62, temperature 98.1 °F (36.7 °C), temperature source Oral, resp. rate 18, height 5' 3\" (1.6 m), weight 186 lb 1.6 oz (84.4 kg), SpO2 99 %, not currently breastfeeding.     Temp:  [97.7 °F (36 Recent Labs   Lab 07/10/19  1020   *         Imaging:          Meds:     • clonazePAM  0.5 mg Oral Nightly   • escitalopram  5 mg Oral Nightly   • aspirin  81 mg Oral Daily   • vancomycin HCl  250 mg Oral Q6H   • Heparin Sodium (Porcine)  5,00

## 2019-07-11 NOTE — PLAN OF CARE
[facility] NOTIFIED THAT PATIENT WILL NOT BE DISCHARGING TODAY. TRANSPORT TO [facility] [place] CANCELLED.

## 2019-07-11 NOTE — PROGRESS NOTES
Hematology/Oncology follow up Note    Overall improving  <10 bowel movements daily  No incontinence     Past Medical History:   Diagnosis Date   • Breast cancer [facility])    • Breast injury 2016    left breast    • Essential hypertension    • Fibrocystic breas mg Oral Nightly   • Pantoprazole Sodium  40 mg Oral BID AC       ALLERGIES:   Adhesive Tape           RASH    Comment:Cloth tape only, plastic okay  Bactrim [Sulfametho*      Codeine Phosphate       RASH  Opioid Analgesics         Penicillins             R - 02 requirements improving now down to [facility]  - on short steroid taper per pulm     Goals of care   - appreciate palliative care recommendations and involvement in this case   - patient still wishes to be DNR/DNI/noBiPAP should she decline     Diarrhea s

## 2019-07-12 ENCOUNTER — APPOINTMENT (OUTPATIENT)
Dept: CV DIAGNOSTICS | Facility: HOSPITAL | Age: 74
DRG: 871 | End: 2019-07-12
Attending: HOSPITALIST
Payer: MEDICARE

## 2019-07-12 VITALS
OXYGEN SATURATION: 95 % | WEIGHT: 186.13 LBS | RESPIRATION RATE: 16 BRPM | HEART RATE: 72 BPM | SYSTOLIC BLOOD PRESSURE: 93 MMHG | TEMPERATURE: 98 F | HEIGHT: 63 IN | BODY MASS INDEX: 32.98 KG/M2 | DIASTOLIC BLOOD PRESSURE: 49 MMHG

## 2019-07-12 LAB
ANION GAP SERPL CALC-SCNC: 2 MMOL/L (ref 0–18)
BUN BLD-MCNC: 27 MG/DL (ref 7–18)
BUN/CREAT SERPL: 27.6 (ref 10–20)
CALCIUM BLD-MCNC: 8.8 MG/DL (ref 8.5–10.1)
CHLORIDE SERPL-SCNC: 103 MMOL/L (ref 98–112)
CO2 SERPL-SCNC: 36 MMOL/L (ref 21–32)
CREAT BLD-MCNC: 0.98 MG/DL (ref 0.55–1.02)
GLUCOSE BLD-MCNC: 88 MG/DL (ref 70–99)
HAV IGM SER QL: 1.9 MG/DL (ref 1.6–2.6)
OSMOLALITY SERPL CALC.SUM OF ELEC: 297 MOSM/KG (ref 275–295)
POTASSIUM SERPL-SCNC: 3.9 MMOL/L (ref 3.5–5.1)
SODIUM SERPL-SCNC: 141 MMOL/L (ref 136–145)
TROPONIN I SERPL-MCNC: 0.08 NG/ML (ref ?–0.04)

## 2019-07-12 PROCEDURE — 93306 TTE W/DOPPLER COMPLETE: CPT | Performed by: HOSPITALIST

## 2019-07-12 PROCEDURE — 99233 SBSQ HOSP IP/OBS HIGH 50: CPT | Performed by: OTHER

## 2019-07-12 RX ORDER — FUROSEMIDE 20 MG/1
20 TABLET ORAL DAILY
Qty: 5 TABLET | Refills: 0 | Status: SHIPPED | OUTPATIENT
Start: 2019-07-12 | End: 2019-07-17

## 2019-07-12 NOTE — BH PROGRESS NOTE
This writer left information at bedside for outpatient follow-up for therapy and medication management. The patient was out to testing at the time.      Jitendra Luis LCSW

## 2019-07-12 NOTE — PLAN OF CARE
Problem: Patient/Family Goals  Goal: Patient/Family Long Term Goal  Description  Patient's Long Term Goal: go home     Interventions:  - airvo, vitals, antibiotics   - See additional Care Plan goals for specific interventions   Outcome: Adequate for Disc ADULT  Goal: Achieves optimal ventilation and oxygenation  Description  INTERVENTIONS:  - Assess for changes in respiratory status  - Assess for changes in mentation and behavior  - Position to facilitate oxygenation and minimize respiratory effort  - Oxyg stability  Description  INTERVENTIONS:  - Monitor vital signs, rhythm, and trends  - Monitor for bleeding, hypotension and signs of decreased cardiac output  - Evaluate effectiveness of vasoactive medications to optimize hemodynamic stability  - Monitor ar

## 2019-07-12 NOTE — DISCHARGE SUMMARY
General Medicine Discharge Summary     Patient ID:  Magaly Flores  68year old  9/12/1945    Admit date: 6/26/2019    Discharge date and time: 7/12/19    Attending Physician: Rubio Arce MD     Consults: IP CONSULT TO HOSPITALIST  IP CONSULT TO ONCOLOGY  I and noted to have Fevers and sent to the hospital.  Patient denies nausea or vomiting, but does note persistent diarrhea 2 watery BM per day noted since se started chemo, she denies chest pain, no other complaints      Hospital Course:   Ms Damaris Hale is T 16 and finally agreed to pscy eval  - no SI or HI   - dr. Tanna Torrez evaluated  - ssri started, as well as clonidine     Acute hypoxic respiratory failure / Poss ARDS - much improved  - 2/2 to PNA, poss pulm edema component, with ARDS  - repeat CXR 6/29 with wor Disposition: SNF    Activity: activity as tolerated  Diet: regular diet  Wound Care: as directed  Code Status: DNR  O2: 1-2L    Home Medication Changes:     Med list     Medication List      START taking these medications    acetaminophen 325 MG Ta STOP taking these medications    diphenoxylate-atropine 2.5-0.025 MG Tabs  Commonly known as:  LOMOTIL           Where to Get Your Medications      You can get these medications from any pharmacy    Bring a paper prescription for each of these medicat weeks. Please continue lasix 20mg daily for 5 days, may need longer if edema persists. Please check BMP in 5-7 days  Please follow up with cardiology in 1 week.            Medication List      START taking these medications    acetaminophen 325 MG Tabs STOP taking these medications    diphenoxylate-atropine 2.5-0.025 MG Tabs  Commonly known as:  LOMOTIL           Where to Get Your Medications      You can get these medications from any pharmacy    Bring a paper prescription for each of these medication

## 2019-07-12 NOTE — PROGRESS NOTES
Patient alert and oriented x4, vitals stable. Continues with vanco for c-diff. Patient was to transfer to rehab but had some complaints of chest pain. Dr Radu Cole notified, see orders. Discharge held. Cardiac workup. Resting in bed. No distress noted.

## 2019-07-12 NOTE — PLAN OF CARE
Patient reported no chest pain overnight. Vitals WNL. Elevated second troponin, on call cardiologist notified and plan for repeat troponin in AM. Patient still with diarrhea overnight but stating it is improving.  Up with 1 assist and walker to bathroom, vo and/or relaxation techniques  - Monitor for opioid side effects  - Notify MD/LIP if interventions unsuccessful or patient reports new pain  - Anticipate increased pain with activity and pre-medicate as appropriate  Outcome: Progressing     Problem: SAFETY rhythm and repeat lab results as appropriate  - Fluid restriction as ordered  - Instruct patient on fluid and nutrition restrictions as appropriate  Outcome: Progressing  Goal: Hemodynamic stability and optimal renal function maintained  Description  INTER

## 2019-07-12 NOTE — PROGRESS NOTES
[facility]  Progress Note    Taina Sun Patient Status:  Inpatient    1945 MRN [id number]   Location [facility] 4W/SW/SE Attending Zaheer Waggoner MD   [facility] Day # 12 PCP Shasha Greenberg MD     Impression:  IMPRESSION:    1 Facility-Administered Medications:  clonazePAM (KLONOPIN) tab 0.5 mg 0.5 mg Oral Nightly   escitalopram (LEXAPRO) tablet 5 mg 5 mg Oral Nightly   aspirin EC EC tab 325 mg 325 mg Oral Daily   vancomycin HCl (FIRVANQ) 50 MG/ML oral solution 250 mg 250 mg Ora interval  change.  -------------------------------------------------------------------          CT chest:  CONCLUSION:   1. Cardiomegaly. No pulmonary artery embolism. 2. Tortuous atherosclerotic aorta without aneurysmal dilatation.   3. Bilateral mixed

## 2019-07-13 ENCOUNTER — EXTERNAL FACILITY (OUTPATIENT)
Dept: INTERNAL MEDICINE CLINIC | Facility: CLINIC | Age: 74
End: 2019-07-13

## 2019-07-13 DIAGNOSIS — R50.81 NEUTROPENIC FEVER (HCC): ICD-10-CM

## 2019-07-13 DIAGNOSIS — J18.9 PNEUMONIA OF LEFT LOWER LOBE DUE TO INFECTIOUS ORGANISM: ICD-10-CM

## 2019-07-13 DIAGNOSIS — C50.919 HER2-POSITIVE CARCINOMA OF BREAST (HCC): ICD-10-CM

## 2019-07-13 DIAGNOSIS — D70.9 NEUTROPENIC FEVER (HCC): ICD-10-CM

## 2019-07-13 DIAGNOSIS — F43.22 ADJUSTMENT DISORDER WITH ANXIETY: ICD-10-CM

## 2019-07-13 PROCEDURE — 99306 1ST NF CARE HIGH MDM 50: CPT | Performed by: INTERNAL MEDICINE

## 2019-07-13 NOTE — PROGRESS NOTES
Magaly Flores is a 68year old   female with history of breast cancer who presented to the hospital with neutropenia and sepsis.   The patient seen today for over 35-minute, follow-up evaluation, over 50% counseling and managing treatment p  staph infection after fall   • Diabetes Mother    • Other (Other) Mother    • Other (Other) Other         muscular dystrophy 2  genetic   • Heart Disease Sister 72   • Colon Cancer Maternal Grandmother         colon cancer   • Cancer Maternal [facility] [place] 7/11/2019 at 18:34     Approved by (CST): Bethany Garcia MD on 7/11/2019 at 18:45            Vitals   07/12/19  1241   BP: 93/49   Pulse: 72   Resp: 16   Temp: 98.3 °F (36.8 °C)       PHYSICAL EXAM:     The patient is alert and oriented x3.   Stated ag therapy. 6.  Patient presented appropriate for skilled nursing facility. 7.  Communicate with attending and agree and treatment.       Orders This Visit:  Orders Placed This Encounter      Urinalysis with Culture Reflex STAT      Lactic Acid, Plasma B12      Ferritin      Iron And Tibc      CBC With Differential With Platelet      Basic Metabolic Panel (8)      Magnesium      Haptoglobin      Troponin I      Lipid Panel      Troponin I      Basic Metabolic Panel (8)      Magnesium      Troponin I

## 2019-07-15 ENCOUNTER — INITIAL APN SNF VISIT (OUTPATIENT)
Dept: INTERNAL MEDICINE CLINIC | Facility: SKILLED NURSING FACILITY | Age: 74
End: 2019-07-15

## 2019-07-15 VITALS
WEIGHT: 162.81 LBS | TEMPERATURE: 98 F | BODY MASS INDEX: 29 KG/M2 | DIASTOLIC BLOOD PRESSURE: 68 MMHG | HEART RATE: 74 BPM | OXYGEN SATURATION: 98 % | RESPIRATION RATE: 20 BRPM | SYSTOLIC BLOOD PRESSURE: 126 MMHG

## 2019-07-15 DIAGNOSIS — C50.919 HER2-POSITIVE CARCINOMA OF BREAST (HCC): ICD-10-CM

## 2019-07-15 DIAGNOSIS — C50.912 INFLAMMATORY BREAST CANCER, LEFT (HCC): ICD-10-CM

## 2019-07-15 DIAGNOSIS — J18.9 PNEUMONIA OF LEFT LOWER LOBE DUE TO INFECTIOUS ORGANISM: ICD-10-CM

## 2019-07-15 DIAGNOSIS — A41.9 SEPSIS, DUE TO UNSPECIFIED ORGANISM: ICD-10-CM

## 2019-07-15 DIAGNOSIS — F43.22 ADJUSTMENT DISORDER WITH ANXIETY: ICD-10-CM

## 2019-07-15 DIAGNOSIS — Z71.89 COUNSELING REGARDING ADVANCE CARE PLANNING AND GOALS OF CARE: ICD-10-CM

## 2019-07-15 PROCEDURE — 99310 SBSQ NF CARE HIGH MDM 45: CPT | Performed by: NURSE PRACTITIONER

## 2019-07-15 PROCEDURE — 1123F ACP DISCUSS/DSCN MKR DOCD: CPT | Performed by: NURSE PRACTITIONER

## 2019-07-15 NOTE — PROGRESS NOTES
Carmen Haddad  : 1945  Age 68year old  female patient is admitted to [facility] 6 19 for rehab and strengthening    Chief complaint:  PNA,Neutropenic fever ,Sepsis    Admission to [facility]:  19 to 19    HPI  68year old female with PMH followed by Dr. Boaz Bennett ( Oncology) . Denies pain, no new issues or com-plaints. Reports her diarrhea has lessoned and stool becoming formed, cont on c-diff precautions and meds. No other new complaints or issues.    Discussed advanced directives, cheryl No      ALLERGIES:    Adhesive Tape           RASH    Comment:Cloth tape only, plastic okay  Bactrim [Sulfametho*      Codeine Phosphate       RASH  Opioid Analgesics         Penicillins             RASH  Doxycycline             RASH    CODE STATUS:  DNR MULTIVITAMIN OR 1 TABLET DAILY Disp:  Rfl:        VITALS:  /68   Pulse 74   Temp 98 °F (36.7 °C) (Tympanic)   Resp 20   Wt 162 lb 12.8 oz (73.8 kg)   SpO2 98%   BMI 28.84 kg/m²      REVIEW OF SYSTEMS:  GENERAL HEALTH:feels well otherwise  SKIN: den extremity  EXTREMITIES/VASCULAR:no cyanosis, clubbing or edema  NEUROLOGIC: follows commands  PSYCHIATRIC: alert and oriented x 3; affect appropriate, slightly grumpy and complaining today      MEDICAL DECISION MAKING  Capable    DIAGNOSTICS REVIEWED AT [facility] cultures NGTD, RVP negative, UA negative  - abx completed   - patient significantly improving   6.  Neutropenic fever / leucopenia / anemia - improved ( Inflammatory Breast Cancer left breast)   - secondary to Chemo  - fever from PNA, now resolved   - treat

## 2019-07-16 ENCOUNTER — EXTERNAL FACILITY (OUTPATIENT)
Dept: INTERNAL MEDICINE CLINIC | Facility: CLINIC | Age: 74
End: 2019-07-16

## 2019-07-16 DIAGNOSIS — C50.919 HER2-POSITIVE CARCINOMA OF BREAST (HCC): ICD-10-CM

## 2019-07-16 DIAGNOSIS — R50.81 NEUTROPENIC FEVER (HCC): ICD-10-CM

## 2019-07-16 DIAGNOSIS — K21.9 GASTROESOPHAGEAL REFLUX DISEASE WITHOUT ESOPHAGITIS: ICD-10-CM

## 2019-07-16 DIAGNOSIS — J18.9 PNEUMONIA OF LEFT LOWER LOBE DUE TO INFECTIOUS ORGANISM: ICD-10-CM

## 2019-07-16 DIAGNOSIS — D70.9 NEUTROPENIC FEVER (HCC): ICD-10-CM

## 2019-07-16 PROCEDURE — 99308 SBSQ NF CARE LOW MDM 20: CPT | Performed by: INTERNAL MEDICINE

## 2019-07-17 ENCOUNTER — SNF VISIT (OUTPATIENT)
Dept: INTERNAL MEDICINE CLINIC | Facility: SKILLED NURSING FACILITY | Age: 74
End: 2019-07-17

## 2019-07-17 VITALS
BODY MASS INDEX: 29 KG/M2 | OXYGEN SATURATION: 98 % | WEIGHT: 162.81 LBS | HEART RATE: 69 BPM | DIASTOLIC BLOOD PRESSURE: 65 MMHG | TEMPERATURE: 98 F | RESPIRATION RATE: 20 BRPM | SYSTOLIC BLOOD PRESSURE: 156 MMHG

## 2019-07-17 DIAGNOSIS — F43.22 ADJUSTMENT DISORDER WITH ANXIETY: ICD-10-CM

## 2019-07-17 DIAGNOSIS — R03.0 ELEVATED BP WITHOUT DIAGNOSIS OF HYPERTENSION: ICD-10-CM

## 2019-07-17 DIAGNOSIS — C50.912 INFLAMMATORY BREAST CANCER, LEFT (HCC): ICD-10-CM

## 2019-07-17 DIAGNOSIS — J18.9 PNEUMONIA OF LEFT LOWER LOBE DUE TO INFECTIOUS ORGANISM: ICD-10-CM

## 2019-07-17 PROCEDURE — 99309 SBSQ NF CARE MODERATE MDM 30: CPT | Performed by: NURSE PRACTITIONER

## 2019-07-17 NOTE — PROGRESS NOTES
Lachelle Gonzalez  : 1945  Age 68year old  female patient is admitted to [facility]SE 19 for rehab and strengthening     Chief complaint:  PNA,Neutropenic fever ,Sepsis     Admission to [address] [place] [address]:  19 to 19     HPI  68year old female with followed by Dr. Lukas Ferrara ( Oncology) . Denies pain, no new issues or complaints.  Reports her diarrhea has lessoned and stool becoming formed, will dc  c-diff isolation  precautions and will continue titration of Vanco .  No other new complaints or issues total) by mouth once daily. (Patient taking differently: Take 25 mg by mouth nightly.  ) Disp: 90 tablet Rfl: 1   Pantoprazole Sodium 40 MG Oral Tab EC Take 1 tablet (40 mg total) by mouth 2 (two) times daily before meals.  Disp: 180 tablet Rfl: 3   MULTIVI normal, RRR; no S3, no S4;   ABDOMEN:  normal active BS+, soft, nondistended; no organomegaly, no masses; no bruits; nontender, no guarding, no rebound tenderness.   :no suprapubic distension  LYMPHATIC:no lymphedema, did order tubigrips   MUSCULOSKELETAL 95% RA  , does desat in therapy but O2 comes right back up , RT following closely        4. HERBERT- improved  - resolved  -weekly cbc and bmp, due in am      5.  Severe Sepsis 2/2 PNA    - on admit: neutropenic / leucopenia, CXR with infiltrate, Fever, LA 4.2

## 2019-07-18 ENCOUNTER — EXTERNAL FACILITY (OUTPATIENT)
Dept: INTERNAL MEDICINE CLINIC | Facility: CLINIC | Age: 74
End: 2019-07-18

## 2019-07-18 DIAGNOSIS — C50.919 HER2-POSITIVE CARCINOMA OF BREAST (HCC): ICD-10-CM

## 2019-07-18 DIAGNOSIS — R50.81 NEUTROPENIC FEVER (HCC): ICD-10-CM

## 2019-07-18 DIAGNOSIS — C50.412 MALIGNANT NEOPLASM OF UPPER-OUTER QUADRANT OF LEFT BREAST IN FEMALE, ESTROGEN RECEPTOR POSITIVE (HCC): ICD-10-CM

## 2019-07-18 DIAGNOSIS — D70.9 NEUTROPENIC FEVER (HCC): ICD-10-CM

## 2019-07-18 DIAGNOSIS — Z17.0 MALIGNANT NEOPLASM OF UPPER-OUTER QUADRANT OF LEFT BREAST IN FEMALE, ESTROGEN RECEPTOR POSITIVE (HCC): ICD-10-CM

## 2019-07-18 DIAGNOSIS — K21.9 GASTROESOPHAGEAL REFLUX DISEASE WITHOUT ESOPHAGITIS: ICD-10-CM

## 2019-07-18 DIAGNOSIS — J18.9 PNEUMONIA OF LEFT LOWER LOBE DUE TO INFECTIOUS ORGANISM: ICD-10-CM

## 2019-07-18 PROCEDURE — 99308 SBSQ NF CARE LOW MDM 20: CPT | Performed by: INTERNAL MEDICINE

## 2019-07-22 ENCOUNTER — SNF VISIT (OUTPATIENT)
Dept: INTERNAL MEDICINE CLINIC | Facility: SKILLED NURSING FACILITY | Age: 74
End: 2019-07-22

## 2019-07-22 VITALS
DIASTOLIC BLOOD PRESSURE: 84 MMHG | SYSTOLIC BLOOD PRESSURE: 158 MMHG | OXYGEN SATURATION: 98 % | RESPIRATION RATE: 20 BRPM | BODY MASS INDEX: 27 KG/M2 | HEART RATE: 72 BPM | TEMPERATURE: 98 F | WEIGHT: 153.31 LBS

## 2019-07-22 DIAGNOSIS — C50.912 INFLAMMATORY BREAST CANCER, LEFT (HCC): ICD-10-CM

## 2019-07-22 DIAGNOSIS — J18.9 PNEUMONIA OF LEFT LOWER LOBE DUE TO INFECTIOUS ORGANISM: ICD-10-CM

## 2019-07-22 DIAGNOSIS — C50.919 HER2-POSITIVE CARCINOMA OF BREAST (HCC): ICD-10-CM

## 2019-07-22 DIAGNOSIS — F32.2 SEVERE MAJOR DEPRESSION, SINGLE EPISODE, WITHOUT PSYCHOTIC FEATURES (HCC): ICD-10-CM

## 2019-07-22 DIAGNOSIS — A41.9 SEPSIS, DUE TO UNSPECIFIED ORGANISM: ICD-10-CM

## 2019-07-22 PROCEDURE — 99309 SBSQ NF CARE MODERATE MDM 30: CPT | Performed by: NURSE PRACTITIONER

## 2019-07-22 NOTE — PROGRESS NOTES
Sarah Cochran  : 1945  Age 68year old  female patient is admitted to [facility]  19 for rehab and strengthening     Chief complaint:  PNA,Neutropenic fever ,Sepsis     Admission to [facility]:  19 to 19     HPI  68year old female with nodes removed. We discussed all this in detail again . Is being followed by Dr. Marly Sullivan ( Oncology) . Denies pain, no new issues or complaints.  Reports her diarrhea has lessoned and stool becoming formed, will dc  c-diff isolation  precautions and will (Patient taking differently: Take 25 mg by mouth nightly.  ) Disp: 90 tablet Rfl: 1   Pantoprazole Sodium 40 MG Oral Tab EC Take 1 tablet (40 mg total) by mouth 2 (two) times daily before meals.  Disp: 180 tablet Rfl: 3   MULTIVITAMIN OR 1 TABLET DAILY Disp S4;   ABDOMEN:  normal active BS+, soft, nondistended; no organomegaly, no masses; no bruits; nontender, no guarding, no rebound tenderness.   :no suprapubic distension  LYMPHATIC:no lymphedema, did order tubigrips   MUSCULOSKELETAL: no acute synovitis up - lasix oral for 5 days per cards  -off O2 , pulse ox 95% RA  , does desat in therapy but O2 comes right back up , RT following closely        4.  HERBERT- improved  - resolved  -weekly cbc and bmp    5. Severe Sepsis 2/2 PNA    - on admit: neutropenic / [facility]

## 2019-07-23 ENCOUNTER — EXTERNAL FACILITY (OUTPATIENT)
Dept: INTERNAL MEDICINE CLINIC | Facility: CLINIC | Age: 74
End: 2019-07-23

## 2019-07-23 DIAGNOSIS — C50.912 INFLAMMATORY BREAST CANCER, LEFT (HCC): ICD-10-CM

## 2019-07-23 DIAGNOSIS — F43.22 ADJUSTMENT DISORDER WITH ANXIETY: ICD-10-CM

## 2019-07-23 DIAGNOSIS — R13.10 DYSPHAGIA, UNSPECIFIED TYPE: ICD-10-CM

## 2019-07-23 PROCEDURE — 99309 SBSQ NF CARE MODERATE MDM 30: CPT | Performed by: INTERNAL MEDICINE

## 2019-07-24 ENCOUNTER — SNF VISIT (OUTPATIENT)
Dept: INTERNAL MEDICINE CLINIC | Facility: SKILLED NURSING FACILITY | Age: 74
End: 2019-07-24

## 2019-07-24 VITALS
SYSTOLIC BLOOD PRESSURE: 128 MMHG | TEMPERATURE: 98 F | HEART RATE: 72 BPM | WEIGHT: 153.38 LBS | DIASTOLIC BLOOD PRESSURE: 88 MMHG | OXYGEN SATURATION: 97 % | RESPIRATION RATE: 20 BRPM | BODY MASS INDEX: 27 KG/M2

## 2019-07-24 DIAGNOSIS — R45.89 SAD MOOD: ICD-10-CM

## 2019-07-24 DIAGNOSIS — R53.1 GENERALIZED WEAKNESS: ICD-10-CM

## 2019-07-24 DIAGNOSIS — C50.912 INFLAMMATORY BREAST CANCER, LEFT (HCC): ICD-10-CM

## 2019-07-24 DIAGNOSIS — C50.919 HER2-POSITIVE CARCINOMA OF BREAST (HCC): ICD-10-CM

## 2019-07-24 DIAGNOSIS — J18.9 PNEUMONIA OF LEFT LOWER LOBE DUE TO INFECTIOUS ORGANISM: ICD-10-CM

## 2019-07-24 DIAGNOSIS — A41.9 SEPSIS, DUE TO UNSPECIFIED ORGANISM: ICD-10-CM

## 2019-07-24 DIAGNOSIS — F43.22 ADJUSTMENT DISORDER WITH ANXIETY: ICD-10-CM

## 2019-07-24 PROCEDURE — 99309 SBSQ NF CARE MODERATE MDM 30: CPT | Performed by: NURSE PRACTITIONER

## 2019-07-24 NOTE — PROGRESS NOTES
Twyla Montoya  : 1945  Age 68year old  female patient is admitted to [facility] 6 19 for rehab and strengthening     Chief complaint:  PNA,Neutropenic fever ,Sepsis, Breast cancer , Feeling sad     Admission to [address] [place] [address]:  19 to 19    Reports her diarrhea has lessoned and stool becoming formed, will dc  c-diff isolation  precautions and will continue titration of Vanco .   Has been followed by ID.  No other new complaints or issues.    Her plan is to be discharged to home with son on Fri or as instructed by physician Disp: 30 tablet Rfl: 3   Ondansetron HCl (ZOFRAN) 8 MG tablet Take 1 tablet (8 mg total) by mouth every 8 (eight) hours as needed for Nausea.  Disp: 20 tablet Rfl: 3   Metoprolol Succinate ER 25 MG Oral Tablet 24 Hr Take 1 tabl no exudate, no visible cerumen. Hair loss from chemo (adriamycin)  NECK: supple; FROM; no JVD, no TMG, no carotid bruits  BREAST: inflammatory breast ca, 2 rounds of chemo- breast no longer red or swollen   RESPIRATORY:clear to percussion and auscultation does not want BIPAP, DNR/DNI  - pulm following in hospital, consulted both pulm and RT to follow in rehab     - RVP negative  - IS, acapella if pt agreeable  - Abx stopped as pt refused on 7/5/19 but got 9 days so likely clinically ok and at this time no i

## 2019-07-25 ENCOUNTER — EXTERNAL FACILITY (OUTPATIENT)
Dept: INTERNAL MEDICINE CLINIC | Facility: CLINIC | Age: 74
End: 2019-07-25

## 2019-07-25 DIAGNOSIS — C50.919 HER2-POSITIVE CARCINOMA OF BREAST (HCC): ICD-10-CM

## 2019-07-25 DIAGNOSIS — F43.22 ADJUSTMENT DISORDER WITH ANXIETY: ICD-10-CM

## 2019-07-25 DIAGNOSIS — K21.9 GASTROESOPHAGEAL REFLUX DISEASE WITHOUT ESOPHAGITIS: ICD-10-CM

## 2019-07-25 PROCEDURE — 99308 SBSQ NF CARE LOW MDM 20: CPT | Performed by: INTERNAL MEDICINE

## 2019-07-26 ENCOUNTER — SNF DISCHARGE (OUTPATIENT)
Dept: INTERNAL MEDICINE CLINIC | Facility: SKILLED NURSING FACILITY | Age: 74
End: 2019-07-26

## 2019-07-26 VITALS
WEIGHT: 181 LBS | DIASTOLIC BLOOD PRESSURE: 81 MMHG | TEMPERATURE: 98 F | SYSTOLIC BLOOD PRESSURE: 133 MMHG | RESPIRATION RATE: 20 BRPM | BODY MASS INDEX: 32 KG/M2 | HEART RATE: 80 BPM | OXYGEN SATURATION: 97 %

## 2019-07-26 DIAGNOSIS — R50.81 NEUTROPENIC FEVER (HCC): ICD-10-CM

## 2019-07-26 DIAGNOSIS — F43.22 ADJUSTMENT DISORDER WITH ANXIETY: ICD-10-CM

## 2019-07-26 DIAGNOSIS — A41.9 SEPSIS, DUE TO UNSPECIFIED ORGANISM: ICD-10-CM

## 2019-07-26 DIAGNOSIS — D70.9 NEUTROPENIC FEVER (HCC): ICD-10-CM

## 2019-07-26 DIAGNOSIS — J18.9 PNEUMONIA OF LEFT LOWER LOBE DUE TO INFECTIOUS ORGANISM: ICD-10-CM

## 2019-07-26 DIAGNOSIS — C50.912 INFLAMMATORY BREAST CANCER, LEFT (HCC): ICD-10-CM

## 2019-07-26 PROCEDURE — 99310 SBSQ NF CARE HIGH MDM 45: CPT | Performed by: NURSE PRACTITIONER

## 2019-07-26 NOTE — PROGRESS NOTES
Lachelle Gonzalez, 9/12/1945, 68year old, female is being discharged from [facility]   7/26/19 to home with son     [address]    Date of Admission [address] [place] [address] Avenue :6/26/19 to 7/12/19    Date of Discharge [place] [facility]: 7/12/19 TO 7/26/19 pain, no new issues or complaints. Reports one  Formed stool today. Had  been followed by ID in rehab .  No other new complaints or issues.   Was  discharged to home with son today  Friday 7/26/19 and be followed by  HH/PT/OT.  Then as she becomes stronger nose, no nasal drainage, mucous membranes pink, moist, pharynx no exudate, no visible cerumen. Hair loss from chemo (adriamycin)  NECK: supple; FROM; no JVD, no TMG, no carotid bruits  BREAST: inflammatory breast ca, 2 rounds of chemo- breast no longer red component, with ARDS  - repeat CXR 6/29 with worsening edema/consolidation, but repeat 7/1 much improved  -weaned off vapotherm, per pt would not want again, does not want BIPAP, DNR/DNI  - pulm following in hospital, consulted both pulm and RT to follow i

## 2019-08-13 PROCEDURE — 87493 C DIFF AMPLIFIED PROBE: CPT | Performed by: FAMILY MEDICINE

## 2019-08-19 PROCEDURE — 88305 TISSUE EXAM BY PATHOLOGIST: CPT | Performed by: SURGERY

## 2019-08-26 PROCEDURE — 87077 CULTURE AEROBIC IDENTIFY: CPT | Performed by: FAMILY MEDICINE

## 2019-08-26 PROCEDURE — 87086 URINE CULTURE/COLONY COUNT: CPT | Performed by: FAMILY MEDICINE

## 2019-08-26 PROCEDURE — 87186 SC STD MICRODIL/AGAR DIL: CPT | Performed by: FAMILY MEDICINE

## 2019-08-26 PROCEDURE — 81001 URINALYSIS AUTO W/SCOPE: CPT | Performed by: FAMILY MEDICINE

## 2019-09-21 ENCOUNTER — LAB ENCOUNTER (OUTPATIENT)
Dept: LAB | Age: 74
DRG: 580 | End: 2019-09-21
Attending: INTERNAL MEDICINE
Payer: MEDICARE

## 2019-09-21 DIAGNOSIS — C50.912 INFLAMMATORY BREAST CANCER, LEFT (HCC): ICD-10-CM

## 2019-09-21 LAB
ALBUMIN SERPL-MCNC: 3.8 G/DL (ref 3.4–5)
ALBUMIN/GLOB SERPL: 1.3 {RATIO} (ref 1–2)
ALP LIVER SERPL-CCNC: 64 U/L (ref 55–142)
ALT SERPL-CCNC: 19 U/L (ref 13–56)
ANION GAP SERPL CALC-SCNC: 4 MMOL/L (ref 0–18)
AST SERPL-CCNC: 16 U/L (ref 15–37)
BASOPHILS # BLD AUTO: 0.03 X10(3) UL (ref 0–0.2)
BASOPHILS NFR BLD AUTO: 0.6 %
BILIRUB SERPL-MCNC: 0.5 MG/DL (ref 0.1–2)
BUN BLD-MCNC: 11 MG/DL (ref 7–18)
BUN/CREAT SERPL: 10.4 (ref 10–20)
CALCIUM BLD-MCNC: 10.6 MG/DL (ref 8.5–10.1)
CHLORIDE SERPL-SCNC: 105 MMOL/L (ref 98–112)
CO2 SERPL-SCNC: 33 MMOL/L (ref 21–32)
CREAT BLD-MCNC: 1.06 MG/DL (ref 0.55–1.02)
DEPRECATED RDW RBC AUTO: 41.8 FL (ref 35.1–46.3)
EOSINOPHIL # BLD AUTO: 0.07 X10(3) UL (ref 0–0.7)
EOSINOPHIL NFR BLD AUTO: 1.4 %
ERYTHROCYTE [DISTWIDTH] IN BLOOD BY AUTOMATED COUNT: 12.8 % (ref 11–15)
GLOBULIN PLAS-MCNC: 2.9 G/DL (ref 2.8–4.4)
GLUCOSE BLD-MCNC: 105 MG/DL (ref 70–99)
HCT VFR BLD AUTO: 41.8 % (ref 35–48)
HGB BLD-MCNC: 13.7 G/DL (ref 12–16)
IMM GRANULOCYTES # BLD AUTO: 0.01 X10(3) UL (ref 0–1)
IMM GRANULOCYTES NFR BLD: 0.2 %
LYMPHOCYTES # BLD AUTO: 1.89 X10(3) UL (ref 1–4)
LYMPHOCYTES NFR BLD AUTO: 37.4 %
M PROTEIN MFR SERPL ELPH: 6.7 G/DL (ref 6.4–8.2)
MCH RBC QN AUTO: 29.3 PG (ref 26–34)
MCHC RBC AUTO-ENTMCNC: 32.8 G/DL (ref 31–37)
MCV RBC AUTO: 89.3 FL (ref 80–100)
MONOCYTES # BLD AUTO: 0.54 X10(3) UL (ref 0.1–1)
MONOCYTES NFR BLD AUTO: 10.7 %
NEUTROPHILS # BLD AUTO: 2.52 X10 (3) UL (ref 1.5–7.7)
NEUTROPHILS # BLD AUTO: 2.52 X10(3) UL (ref 1.5–7.7)
NEUTROPHILS NFR BLD AUTO: 49.7 %
OSMOLALITY SERPL CALC.SUM OF ELEC: 294 MOSM/KG (ref 275–295)
PATIENT FASTING: YES
PLATELET # BLD AUTO: 257 10(3)UL (ref 150–450)
POTASSIUM SERPL-SCNC: 4.1 MMOL/L (ref 3.5–5.1)
RBC # BLD AUTO: 4.68 X10(6)UL (ref 3.8–5.3)
SODIUM SERPL-SCNC: 142 MMOL/L (ref 136–145)
WBC # BLD AUTO: 5.1 X10(3) UL (ref 4–11)

## 2019-09-21 PROCEDURE — 85025 COMPLETE CBC W/AUTO DIFF WBC: CPT

## 2019-09-21 PROCEDURE — 80053 COMPREHEN METABOLIC PANEL: CPT

## 2019-09-21 PROCEDURE — 36415 COLL VENOUS BLD VENIPUNCTURE: CPT

## 2019-09-24 ENCOUNTER — ANESTHESIA (OUTPATIENT)
Dept: SURGERY | Facility: HOSPITAL | Age: 74
DRG: 580 | End: 2019-09-24
Payer: MEDICARE

## 2019-09-24 ENCOUNTER — HOSPITAL ENCOUNTER (INPATIENT)
Facility: HOSPITAL | Age: 74
LOS: 1 days | Discharge: HOME | DRG: 580 | End: 2019-09-25
Attending: SURGERY | Admitting: SURGERY
Payer: MEDICARE

## 2019-09-24 ENCOUNTER — ANESTHESIA EVENT (OUTPATIENT)
Dept: SURGERY | Facility: HOSPITAL | Age: 74
DRG: 580 | End: 2019-09-24
Payer: MEDICARE

## 2019-09-24 ENCOUNTER — APPOINTMENT (OUTPATIENT)
Dept: MAMMOGRAPHY | Facility: HOSPITAL | Age: 74
DRG: 580 | End: 2019-09-24
Attending: SURGERY
Payer: MEDICARE

## 2019-09-24 ENCOUNTER — HOSPITAL ENCOUNTER (OUTPATIENT)
Dept: ULTRASOUND IMAGING | Facility: HOSPITAL | Age: 74
Discharge: HOME | DRG: 580 | End: 2019-09-24
Attending: SURGERY | Admitting: SURGERY
Payer: MEDICARE

## 2019-09-24 DIAGNOSIS — Z17.0 MALIGNANT NEOPLASM OF UPPER-OUTER QUADRANT OF LEFT BREAST IN FEMALE, ESTROGEN RECEPTOR POSITIVE (HCC): ICD-10-CM

## 2019-09-24 DIAGNOSIS — C50.412 MALIGNANT NEOPLASM OF UPPER-OUTER QUADRANT OF LEFT BREAST IN FEMALE, ESTROGEN RECEPTOR POSITIVE (HCC): ICD-10-CM

## 2019-09-24 PROCEDURE — 88307 TISSUE EXAM BY PATHOLOGIST: CPT | Performed by: SURGERY

## 2019-09-24 PROCEDURE — 07B60ZZ EXCISION OF LEFT AXILLARY LYMPHATIC, OPEN APPROACH: ICD-10-PCS | Performed by: SURGERY

## 2019-09-24 PROCEDURE — 88309 TISSUE EXAM BY PATHOLOGIST: CPT | Performed by: SURGERY

## 2019-09-24 PROCEDURE — 88300 SURGICAL PATH GROSS: CPT | Performed by: SURGERY

## 2019-09-24 PROCEDURE — 0HTU0ZZ RESECTION OF LEFT BREAST, OPEN APPROACH: ICD-10-PCS | Performed by: SURGERY

## 2019-09-24 PROCEDURE — 88342 IMHCHEM/IMCYTCHM 1ST ANTB: CPT | Performed by: SURGERY

## 2019-09-24 RX ORDER — DOCUSATE SODIUM 100 MG/1
100 CAPSULE, LIQUID FILLED ORAL 2 TIMES DAILY
Status: DISCONTINUED | OUTPATIENT
Start: 2019-09-24 | End: 2019-09-25

## 2019-09-24 RX ORDER — ONDANSETRON 2 MG/ML
4 INJECTION INTRAMUSCULAR; INTRAVENOUS ONCE AS NEEDED
Status: DISCONTINUED | OUTPATIENT
Start: 2019-09-24 | End: 2019-09-24 | Stop reason: HOSPADM

## 2019-09-24 RX ORDER — GLYCOPYRROLATE 0.2 MG/ML
INJECTION INTRAMUSCULAR; INTRAVENOUS AS NEEDED
Status: DISCONTINUED | OUTPATIENT
Start: 2019-09-24 | End: 2019-09-24 | Stop reason: SURG

## 2019-09-24 RX ORDER — CLONAZEPAM 0.5 MG/1
0.5 TABLET ORAL NIGHTLY
Status: DISCONTINUED | OUTPATIENT
Start: 2019-09-24 | End: 2019-09-25

## 2019-09-24 RX ORDER — EPHEDRINE SULFATE 50 MG/ML
INJECTION, SOLUTION INTRAVENOUS AS NEEDED
Status: DISCONTINUED | OUTPATIENT
Start: 2019-09-24 | End: 2019-09-24 | Stop reason: SURG

## 2019-09-24 RX ORDER — HYDROCODONE BITARTRATE AND ACETAMINOPHEN 5; 325 MG/1; MG/1
1 TABLET ORAL AS NEEDED
Status: DISCONTINUED | OUTPATIENT
Start: 2019-09-24 | End: 2019-09-24

## 2019-09-24 RX ORDER — MIDAZOLAM HYDROCHLORIDE 1 MG/ML
INJECTION INTRAMUSCULAR; INTRAVENOUS AS NEEDED
Status: DISCONTINUED | OUTPATIENT
Start: 2019-09-24 | End: 2019-09-24 | Stop reason: SURG

## 2019-09-24 RX ORDER — ONDANSETRON 2 MG/ML
INJECTION INTRAMUSCULAR; INTRAVENOUS AS NEEDED
Status: DISCONTINUED | OUTPATIENT
Start: 2019-09-24 | End: 2019-09-24 | Stop reason: SURG

## 2019-09-24 RX ORDER — MORPHINE SULFATE 4 MG/ML
4 INJECTION, SOLUTION INTRAMUSCULAR; INTRAVENOUS EVERY 10 MIN PRN
Status: DISCONTINUED | OUTPATIENT
Start: 2019-09-24 | End: 2019-09-24 | Stop reason: HOSPADM

## 2019-09-24 RX ORDER — HYDROMORPHONE HYDROCHLORIDE 1 MG/ML
0.6 INJECTION, SOLUTION INTRAMUSCULAR; INTRAVENOUS; SUBCUTANEOUS EVERY 5 MIN PRN
Status: DISCONTINUED | OUTPATIENT
Start: 2019-09-24 | End: 2019-09-24 | Stop reason: HOSPADM

## 2019-09-24 RX ORDER — MORPHINE SULFATE 10 MG/ML
6 INJECTION, SOLUTION INTRAMUSCULAR; INTRAVENOUS EVERY 10 MIN PRN
Status: DISCONTINUED | OUTPATIENT
Start: 2019-09-24 | End: 2019-09-24 | Stop reason: HOSPADM

## 2019-09-24 RX ORDER — DEXAMETHASONE SODIUM PHOSPHATE 4 MG/ML
VIAL (ML) INJECTION AS NEEDED
Status: DISCONTINUED | OUTPATIENT
Start: 2019-09-24 | End: 2019-09-24 | Stop reason: SURG

## 2019-09-24 RX ORDER — HYDROCODONE BITARTRATE AND ACETAMINOPHEN 5; 325 MG/1; MG/1
1 TABLET ORAL EVERY 4 HOURS PRN
Status: DISCONTINUED | OUTPATIENT
Start: 2019-09-24 | End: 2019-09-25

## 2019-09-24 RX ORDER — ONDANSETRON 2 MG/ML
4 INJECTION INTRAMUSCULAR; INTRAVENOUS EVERY 6 HOURS PRN
Status: DISCONTINUED | OUTPATIENT
Start: 2019-09-24 | End: 2019-09-25

## 2019-09-24 RX ORDER — FAMOTIDINE 20 MG/1
20 TABLET ORAL ONCE
Status: DISCONTINUED | OUTPATIENT
Start: 2019-09-24 | End: 2019-09-24 | Stop reason: HOSPADM

## 2019-09-24 RX ORDER — HYDROCODONE BITARTRATE AND ACETAMINOPHEN 5; 325 MG/1; MG/1
2 TABLET ORAL AS NEEDED
Status: DISCONTINUED | OUTPATIENT
Start: 2019-09-24 | End: 2019-09-24

## 2019-09-24 RX ORDER — HYDROMORPHONE HYDROCHLORIDE 1 MG/ML
0.6 INJECTION, SOLUTION INTRAMUSCULAR; INTRAVENOUS; SUBCUTANEOUS EVERY 5 MIN PRN
Status: DISCONTINUED | OUTPATIENT
Start: 2019-09-24 | End: 2019-09-24

## 2019-09-24 RX ORDER — HALOPERIDOL 5 MG/ML
0.25 INJECTION INTRAMUSCULAR ONCE AS NEEDED
Status: DISCONTINUED | OUTPATIENT
Start: 2019-09-24 | End: 2019-09-24 | Stop reason: HOSPADM

## 2019-09-24 RX ORDER — METOPROLOL SUCCINATE 25 MG/1
25 TABLET, EXTENDED RELEASE ORAL NIGHTLY
Status: DISCONTINUED | OUTPATIENT
Start: 2019-09-24 | End: 2019-09-25

## 2019-09-24 RX ORDER — SODIUM PHOSPHATE, DIBASIC AND SODIUM PHOSPHATE, MONOBASIC 7; 19 G/133ML; G/133ML
1 ENEMA RECTAL ONCE AS NEEDED
Status: DISCONTINUED | OUTPATIENT
Start: 2019-09-24 | End: 2019-09-25

## 2019-09-24 RX ORDER — METOPROLOL TARTRATE 5 MG/5ML
2.5 INJECTION INTRAVENOUS ONCE
Status: DISCONTINUED | OUTPATIENT
Start: 2019-09-24 | End: 2019-09-24 | Stop reason: HOSPADM

## 2019-09-24 RX ORDER — ACETAMINOPHEN 500 MG
1000 TABLET ORAL ONCE
Status: COMPLETED | OUTPATIENT
Start: 2019-09-24 | End: 2019-09-24

## 2019-09-24 RX ORDER — HYDROCODONE BITARTRATE AND ACETAMINOPHEN 5; 325 MG/1; MG/1
2 TABLET ORAL EVERY 4 HOURS PRN
Status: DISCONTINUED | OUTPATIENT
Start: 2019-09-24 | End: 2019-09-25

## 2019-09-24 RX ORDER — HYDROMORPHONE HYDROCHLORIDE 1 MG/ML
0.2 INJECTION, SOLUTION INTRAMUSCULAR; INTRAVENOUS; SUBCUTANEOUS EVERY 5 MIN PRN
Status: DISCONTINUED | OUTPATIENT
Start: 2019-09-24 | End: 2019-09-24 | Stop reason: HOSPADM

## 2019-09-24 RX ORDER — PROCHLORPERAZINE EDISYLATE 5 MG/ML
5 INJECTION INTRAMUSCULAR; INTRAVENOUS ONCE AS NEEDED
Status: DISCONTINUED | OUTPATIENT
Start: 2019-09-24 | End: 2019-09-24 | Stop reason: HOSPADM

## 2019-09-24 RX ORDER — ESCITALOPRAM OXALATE 10 MG/1
10 TABLET ORAL NIGHTLY
Status: DISCONTINUED | OUTPATIENT
Start: 2019-09-24 | End: 2019-09-25

## 2019-09-24 RX ORDER — NALOXONE HYDROCHLORIDE 0.4 MG/ML
80 INJECTION, SOLUTION INTRAMUSCULAR; INTRAVENOUS; SUBCUTANEOUS AS NEEDED
Status: DISCONTINUED | OUTPATIENT
Start: 2019-09-24 | End: 2019-09-24 | Stop reason: HOSPADM

## 2019-09-24 RX ORDER — HYDROMORPHONE HYDROCHLORIDE 1 MG/ML
0.4 INJECTION, SOLUTION INTRAMUSCULAR; INTRAVENOUS; SUBCUTANEOUS EVERY 5 MIN PRN
Status: DISCONTINUED | OUTPATIENT
Start: 2019-09-24 | End: 2019-09-24 | Stop reason: HOSPADM

## 2019-09-24 RX ORDER — METOCLOPRAMIDE 10 MG/1
10 TABLET ORAL ONCE
Status: DISCONTINUED | OUTPATIENT
Start: 2019-09-24 | End: 2019-09-24 | Stop reason: HOSPADM

## 2019-09-24 RX ORDER — SODIUM CHLORIDE, SODIUM LACTATE, POTASSIUM CHLORIDE, CALCIUM CHLORIDE 600; 310; 30; 20 MG/100ML; MG/100ML; MG/100ML; MG/100ML
INJECTION, SOLUTION INTRAVENOUS CONTINUOUS
Status: DISCONTINUED | OUTPATIENT
Start: 2019-09-24 | End: 2019-09-24

## 2019-09-24 RX ORDER — MORPHINE SULFATE 4 MG/ML
2 INJECTION, SOLUTION INTRAMUSCULAR; INTRAVENOUS EVERY 10 MIN PRN
Status: DISCONTINUED | OUTPATIENT
Start: 2019-09-24 | End: 2019-09-24 | Stop reason: HOSPADM

## 2019-09-24 RX ORDER — HYDROMORPHONE HYDROCHLORIDE 1 MG/ML
0.4 INJECTION, SOLUTION INTRAMUSCULAR; INTRAVENOUS; SUBCUTANEOUS
Status: DISCONTINUED | OUTPATIENT
Start: 2019-09-24 | End: 2019-09-25

## 2019-09-24 RX ORDER — HYDROMORPHONE HYDROCHLORIDE 1 MG/ML
0.2 INJECTION, SOLUTION INTRAMUSCULAR; INTRAVENOUS; SUBCUTANEOUS EVERY 5 MIN PRN
Status: DISCONTINUED | OUTPATIENT
Start: 2019-09-24 | End: 2019-09-24

## 2019-09-24 RX ORDER — HYDROMORPHONE HYDROCHLORIDE 1 MG/ML
0.4 INJECTION, SOLUTION INTRAMUSCULAR; INTRAVENOUS; SUBCUTANEOUS EVERY 5 MIN PRN
Status: DISCONTINUED | OUTPATIENT
Start: 2019-09-24 | End: 2019-09-24

## 2019-09-24 RX ORDER — SODIUM CHLORIDE, SODIUM LACTATE, POTASSIUM CHLORIDE, CALCIUM CHLORIDE 600; 310; 30; 20 MG/100ML; MG/100ML; MG/100ML; MG/100ML
INJECTION, SOLUTION INTRAVENOUS CONTINUOUS
Status: DISCONTINUED | OUTPATIENT
Start: 2019-09-24 | End: 2019-09-24 | Stop reason: HOSPADM

## 2019-09-24 RX ORDER — HYDROMORPHONE HYDROCHLORIDE 1 MG/ML
0.2 INJECTION, SOLUTION INTRAMUSCULAR; INTRAVENOUS; SUBCUTANEOUS
Status: DISCONTINUED | OUTPATIENT
Start: 2019-09-24 | End: 2019-09-25

## 2019-09-24 RX ORDER — DEXTROSE, SODIUM CHLORIDE, AND POTASSIUM CHLORIDE 5; .45; .15 G/100ML; G/100ML; G/100ML
INJECTION INTRAVENOUS CONTINUOUS
Status: DISCONTINUED | OUTPATIENT
Start: 2019-09-24 | End: 2019-09-25

## 2019-09-24 RX ORDER — PANTOPRAZOLE SODIUM 40 MG/1
40 TABLET, DELAYED RELEASE ORAL
Status: DISCONTINUED | OUTPATIENT
Start: 2019-09-24 | End: 2019-09-25

## 2019-09-24 RX ORDER — POLYETHYLENE GLYCOL 3350 17 G/17G
17 POWDER, FOR SOLUTION ORAL DAILY PRN
Status: DISCONTINUED | OUTPATIENT
Start: 2019-09-24 | End: 2019-09-25

## 2019-09-24 RX ORDER — BISACODYL 10 MG
10 SUPPOSITORY, RECTAL RECTAL
Status: DISCONTINUED | OUTPATIENT
Start: 2019-09-24 | End: 2019-09-25

## 2019-09-24 RX ORDER — ACETAMINOPHEN 325 MG/1
650 TABLET ORAL EVERY 4 HOURS PRN
Status: DISCONTINUED | OUTPATIENT
Start: 2019-09-24 | End: 2019-09-25

## 2019-09-24 RX ORDER — LIDOCAINE HYDROCHLORIDE 10 MG/ML
INJECTION, SOLUTION EPIDURAL; INFILTRATION; INTRACAUDAL; PERINEURAL AS NEEDED
Status: DISCONTINUED | OUTPATIENT
Start: 2019-09-24 | End: 2019-09-24 | Stop reason: SURG

## 2019-09-24 RX ADMIN — GLYCOPYRROLATE 0.2 MG: 0.2 INJECTION INTRAMUSCULAR; INTRAVENOUS at 12:50:00

## 2019-09-24 RX ADMIN — LIDOCAINE HYDROCHLORIDE 25 MG: 10 INJECTION, SOLUTION EPIDURAL; INFILTRATION; INTRACAUDAL; PERINEURAL at 12:39:00

## 2019-09-24 RX ADMIN — SODIUM CHLORIDE, SODIUM LACTATE, POTASSIUM CHLORIDE, CALCIUM CHLORIDE: 600; 310; 30; 20 INJECTION, SOLUTION INTRAVENOUS at 13:09:00

## 2019-09-24 RX ADMIN — EPHEDRINE SULFATE 5 MG: 50 INJECTION, SOLUTION INTRAVENOUS at 13:55:00

## 2019-09-24 RX ADMIN — SODIUM CHLORIDE, SODIUM LACTATE, POTASSIUM CHLORIDE, CALCIUM CHLORIDE: 600; 310; 30; 20 INJECTION, SOLUTION INTRAVENOUS at 15:27:00

## 2019-09-24 RX ADMIN — ONDANSETRON 4 MG: 2 INJECTION INTRAMUSCULAR; INTRAVENOUS at 15:00:00

## 2019-09-24 RX ADMIN — SODIUM CHLORIDE, SODIUM LACTATE, POTASSIUM CHLORIDE, CALCIUM CHLORIDE: 600; 310; 30; 20 INJECTION, SOLUTION INTRAVENOUS at 12:36:00

## 2019-09-24 RX ADMIN — EPHEDRINE SULFATE 10 MG: 50 INJECTION, SOLUTION INTRAVENOUS at 12:53:00

## 2019-09-24 RX ADMIN — SODIUM CHLORIDE, SODIUM LACTATE, POTASSIUM CHLORIDE, CALCIUM CHLORIDE: 600; 310; 30; 20 INJECTION, SOLUTION INTRAVENOUS at 14:04:00

## 2019-09-24 RX ADMIN — MIDAZOLAM HYDROCHLORIDE 2 MG: 1 INJECTION INTRAMUSCULAR; INTRAVENOUS at 12:37:00

## 2019-09-24 RX ADMIN — DEXAMETHASONE SODIUM PHOSPHATE 4 MG: 4 MG/ML VIAL (ML) INJECTION at 13:22:00

## 2019-09-24 NOTE — BRIEF OP NOTE
[facility] POST ANESTHESIA CARE UNIT  Brief Op Note        Patients Name: Raheelpacheco Sotod  Attending Physician: Anjana Alonso MD  Operating Physician: Heather Miranda MD  CSN: [id number]     Location:  OR  MRN: [id number]    YOB: 1945  Admis

## 2019-09-24 NOTE — PLAN OF CARE
Patient arrived from PACU alert. Pain controlled from meds given in PACU, denies needing anything at this time. CAROL drains intact. Surgical dressings CDI. Tolerating clear liquid diet, denies nausea. Remote tele in place. Awaiting post-op void.  Ambulate wit anticipated neutropenic period  Description  INTERVENTIONS  - Monitor WBC  - Administer growth factors as ordered  - Implement neutropenic guidelines  Outcome: Progressing     Problem: SAFETY ADULT - FALL  Goal: Free from fall injury  Description  INTERVEN ordered  - Obtain nutritional consult as needed  - Evaluate fluid balance  Outcome: Progressing     Problem: SKIN/TISSUE INTEGRITY - ADULT  Goal: Skin integrity remains intact  Description  INTERVENTIONS  - Assess and document risk factors for pressure ulc with IV or PO as ordered and tolerated  - Evaluate effectiveness of GI medications  - Encourage mobilization and activity  - Obtain nutritional consult as needed  - Establish a toileting routine/schedule  - Consider collaborating with pharmacy to review pa

## 2019-09-24 NOTE — ANESTHESIA POSTPROCEDURE EVALUATION
Patient: Lux Granda    Procedure Summary     Date:  09/24/19 Room / Location:  [facility] OR 02 / [facility] OR    Anesthesia Start:  1049 Anesthesia Stop:  3434    Procedure:  BREAST MASTECTOMY (Left ) Diagnosis:  (inflammatory left breast cancer)    Surg

## 2019-09-24 NOTE — ANESTHESIA PREPROCEDURE EVALUATION
Anesthesia PreOp Note    HPI:     Taina Sun is a 69year old female who presents for preoperative consultation requested by: Marylen Celestine, MD    Date of Surgery: 9/24/2019    Procedure(s):  BREAST MASTECTOMY  Indication: inflammatory left breast canc Diagnosis Date   • Anxiety state    • Breast cancer [facility])    • Breast injury 2016    left breast    • Esophageal reflux    • Essential hypertension    • Fibrocystic breast 2016    left breast   • GERD    • Osteoarthritis, knee    • PONV (postoperative na tablet Rfl: 3 9/23/2019 at 2000       Current Facility-Administered Medications Ordered in Epic:  lactated ringers infusion  Intravenous Continuous Alyce Lobo MD Last Rate: 20 mL/hr at 09/24/19 0958   metoprolol Tartrate (LOPRESSOR) tab 25 mg 25 mg Ora Comment: socially      Drug use: No      Sexual activity: Not Currently    Lifestyle      Physical activity:        Days per week: Not on file        Minutes per session: Not on file      Stress: Not on file    Relationships      Social connections: Her oral temperature is 97.5 °F (36.4 °C). Her blood pressure is 138/82 and her pulse is 57. Her respiration is 16 and oxygen saturation is 94%.     09/20/19  1457 09/24/19  0936   BP:  138/82   Pulse:  57   Resp:  16   Temp:  97.5 °F (36.4 °C)   TempSrc:

## 2019-09-24 NOTE — ANESTHESIA PROCEDURE NOTES
Airway  Date/Time: 9/24/2019 12:47 PM  Urgency: elective    Airway not difficult    General Information and Staff    Patient location during procedure: OR  Anesthesiologist: Namita Erickson MD  Resident/CRNA: Maya Delgadillo CRNA  Performed: Adi Bustamante

## 2019-09-24 NOTE — H&P
[facility] Hospitalist H&P     CC: post op left mastectomy      PCP: Vanessa Rodriguez MD    Admission Date: 9/24/2019    ASSESSMENT / PLAN:   Ms Bates President is a 67 year old female with PMH sig for HTN, GERD, and left sided Breast Cancer HER2+ who presented for planne respiratory failure, possible ARDS, C diff and depression. Patient opted for no additional chemotherapy and is DNR but wished to proceed with surgery.        PMH  Past Medical History:   Diagnosis Date   • Anxiety state    • Breast cancer [facility])    • Breast mouth nightly.  ) Disp: 90 tablet Rfl: 1   clonazePAM 0.5 MG Oral Tab Take 1 tablet (0.5 mg total) by mouth nightly. Disp: 30 tablet Rfl: 0   Metoprolol Succinate ER 25 MG Oral Tablet 24 Hr Take 1 tablet (25 mg total) by mouth nightly.  Hold Rx until pt due 11   CREATSERUM 1.06*   GFRAA 60   GFRNAA 52*   CA 10.6*      K 4.1      CO2 33.0*          No results for input(s): TROP in the last 168 hours.         Radiology:

## 2019-09-24 NOTE — H&P
Suzanne Kenneth a 68year old female presents to the office for recently diagnosed left breast cancer. Mammogram was suspicious for density. Michael Galvin Ultrasound confirmed the mass. . Previous breast procedures include recent ultrasound-guided core biopsy of left       Final Diagnosis:   A.  MRI guided core biopsy, enhancement, right breast, 10:00 mid one third position:  -Proliferative fibrocystic changes with usual duct hyperplasia, stromal fibrosis, duct ectasia, apocrine metaplasia and areas of complex ap excised. Proliferative fibrocystic changes with usual duct hyperplasia, dense stromal fibrosis and duct ectasia. Negative for malignancy. These benign findings are concordant with imaging findings.  Appropriate surgical/oncologic management is recommended o care.  =====  IMPRESSION:    1. Successful RIGHT MRI guided biopsy, 2 sites as above  2. Successful marker clip placement with confirmation by RIGHT digital diagnostic mammogram.  3. See addendum for radiology pathology correlation.   BI-RADS Final Assessme month, on 10mg for about 2 weeks  - continue 10mg daily  - start taking clonazepam on a PRN basis instead of nightly  - f/u in 1-2 months  The patient indicates understanding of these issues and agrees to the plan.   The patient is asked to return 6 months  SEASONAL ALLERGIES     • Usual hyperplasia of lactiferous duct 2016     left breast              Past Surgical History:   Procedure Laterality Date   • CHOLECYSTECTOMY         2009   • COLONOSCOPY         2008 panendoscopy normal    • ESOPHAGOGASTRODUODENO bruising or excessive bleeding  ALLERGY/IMM.: denies food or seasonal allergies         PHYSICAL EXAM:     NECK/THYROID: neck supple, full range of motion, no cervical lymphadenopathy.  testing  HEART: no murmurs, regular rate and rhythm, S1, S2 normal.   L feels this could be biopsied. Explained to the patient we will arrange this. Also have ordered an MRI is recommended by radiology, also with the apparent lymphedema of the breast gets best to further characterize this primary tumor.   After the patient le explained possible poor wound healing, lymphedema the arm was a significant risk. I explained the option of sentinel node biopsy however with positive lymph node with inflammatory breast cancer we will plan to proceed with standard lymph node dissection.

## 2019-09-25 VITALS
DIASTOLIC BLOOD PRESSURE: 67 MMHG | SYSTOLIC BLOOD PRESSURE: 111 MMHG | OXYGEN SATURATION: 90 % | HEART RATE: 72 BPM | WEIGHT: 150 LBS | TEMPERATURE: 99 F | BODY MASS INDEX: 26.58 KG/M2 | HEIGHT: 63 IN | RESPIRATION RATE: 18 BRPM

## 2019-09-25 PROCEDURE — 85025 COMPLETE CBC W/AUTO DIFF WBC: CPT | Performed by: PHYSICIAN ASSISTANT

## 2019-09-25 PROCEDURE — 80048 BASIC METABOLIC PNL TOTAL CA: CPT | Performed by: PHYSICIAN ASSISTANT

## 2019-09-25 RX ORDER — ACETAMINOPHEN 325 MG/1
650 TABLET ORAL EVERY 4 HOURS PRN
Qty: 30 TABLET | Refills: 0 | Status: SHIPPED | OUTPATIENT
Start: 2019-09-25

## 2019-09-25 NOTE — CM/SW NOTE
MD order received regarding POLST. POLST form is completed and scanned into Epic. Plan is for discharge home today 9/25.     [phone]

## 2019-09-25 NOTE — PROGRESS NOTES
[facility]    Progress Note    Alamance Roberto Patient Status:  Inpatient    1945 MRN [id number]   Location [facility] 4W/SW/SE Attending Juan Cunningham MD   Hosp Day # 1 PCP Connie Bingham MD            Subjective:     Pt Assessment and Plan:       Malignant neoplasm of upper-outer quadrant of left breast in female, estrogen receptor positive ([facility] [address]  Pt doing well  'wound OK  Home today  Instructions given  Pt has drain instructions  Follow up with me in 5 days office

## 2019-09-25 NOTE — PLAN OF CARE
Patient alert and oriented. On room air. On remote tele. SCDs for DVT prophylaxis. Patient tolerating general diet without nausea. Pain being controlled with tylenol. Tylenol scripts handed to patient. Voiding freely. Passing gas.  CAROL drain in place, patialin Manage/alleviate anxiety  - Utilize distraction and/or relaxation techniques  - Monitor for opioid side effects  - Notify MD/LIP if interventions unsuccessful or patient reports new pain  - Anticipate increased pain with activity and pre-medicate as approp Discharge     Problem: GASTROINTESTINAL - ADULT  Goal: Minimal or absence of nausea and vomiting  Description  INTERVENTIONS:  - Maintain adequate hydration with IV or PO as ordered and tolerated  - Nasogastric tube to low intermittent suction as ordered development  - Assess and document skin integrity  - Assess and document dressing/incision, wound bed, drain sites and surrounding tissue  - Implement wound care per orders  - Initiate isolation precautions as appropriate  - Initiate Pressure Ulcer prevent

## 2019-09-25 NOTE — PLAN OF CARE
Problem: Patient Centered Care  Goal: Patient preferences are identified and integrated in the patient's plan of care  Description  Interventions:  - What would you like us to know as we care for you?   - Provide timely, complete, and accurate informatio period  Description  INTERVENTIONS  - Monitor WBC  - Administer growth factors as ordered  - Implement neutropenic guidelines  Outcome: Progressing     Problem: SAFETY ADULT - FALL  Goal: Free from fall injury  Description  INTERVENTIONS:  - Assess pt freq consult as needed  - Evaluate fluid balance  Outcome: Progressing  Goal: Maintains or returns to baseline bowel function  Description  INTERVENTIONS:  - Assess bowel function  - Maintain adequate hydration with IV or PO as ordered and tolerated  - Evaluate transferring and ambulating w/ or w/o assistive devices  - Assist with transfers and ambulation using safe patient handling equipment as needed  - Ensure adequate protection for wounds/incisions during mobilization  - Obtain PT/OT consults as needed  - Adv

## 2019-09-26 NOTE — DISCHARGE SUMMARY
[facility] Internal Medicine Discharge Summary   Patient ID:  Olive Nunes  [id number]  11 year old  9/12/1945    Admit date: 9/24/2019    Discharge date and time: 9/25/2019  2:40 PM     Attending Physician: No att. providers found     Primary Care Physician: JUNI Left Sided Breast cancer with mets to LN s/p Left Mastectomy w/ LN dissection  -follows with  as OP s/p neoadjuvant chemo w/good response  -will continue 1 year herceptin and possible radiation  - taking tylenol only for pain  - monitor for ac right breast anterior one third there is no severe enhancement at either biopsy site. There are no mass lesions. There are no abnormal areas of enhancement with suspicious kinetics to suggest malignant disease.  No abnormal internal mammary or axillary christa Otilio Hester M.D.        Operative Procedures: Procedure(s) (LRB):  BREAST MASTECTOMY (Left)     Day of discharge feels great, pain controlled, no N/V    Exam   09/25/19  0500   BP: 111/67   Pulse: 72   Resp: 18   Temp: 98.7 °F (37.1 °C)     No acute dis

## 2019-09-30 NOTE — OPERATIVE REPORT
[facility]    PATIENT'S NAME: Jarod Winn   ATTENDING PHYSICIAN: Yanick Finch MD   OPERATING PHYSICIAN: Glynn Samaniego MD   PATIENT ACCOUNT#:   [id number]    LOCATION:  [address] [place] [address] RECORD #:   [id number]       DATE OF BIRTH:  1 dissection and what this entailed. I also discussed the option of mastectomy and reconstruction. Patient did not want reconstruction. She just wanted a mastectomy and did not want any further treatment. She did not want any reconstruction.   She under seemed when we lifted the arm that she needed all the skin to bring it up without tension, so this was left for the time being. Once we irrigated, we closed. We placed 2 drains, 1 toward the axilla more laterally and 1 more medially underneath the flaps.

## 2019-10-24 ENCOUNTER — OFFICE VISIT (OUTPATIENT)
Dept: PHYSICAL THERAPY | Facility: HOSPITAL | Age: 74
End: 2019-10-24
Attending: SURGERY
Payer: MEDICARE

## 2019-10-24 DIAGNOSIS — C50.919 BREAST CANCER (HCC): Primary | ICD-10-CM

## 2019-10-24 DIAGNOSIS — Z91.89 AT RISK FOR LYMPHEDEMA: ICD-10-CM

## 2019-10-24 PROCEDURE — 97161 PT EVAL LOW COMPLEX 20 MIN: CPT | Performed by: PHYSICAL THERAPIST

## 2019-10-24 PROCEDURE — 97110 THERAPEUTIC EXERCISES: CPT | Performed by: PHYSICAL THERAPIST

## 2019-10-24 PROCEDURE — 97140 MANUAL THERAPY 1/> REGIONS: CPT | Performed by: PHYSICAL THERAPIST

## 2019-10-24 NOTE — PROGRESS NOTES
[facility] LYMPHEDEMA EVALUATION:   Referring Physician: Dr. Tyrone Pro  Diagnosis: total mastectomy of left breast (H12.06)    Date of onset: 9/24/19 Date of Service: 10/24/2019     PATIENT SUMMARY   Gilmar Nettles is a 69year old female who presents to therapy toda education/self management. Patient may also benefit from ROM/strengthening of right shoulder as she reports her motion and strength has regressed since she has not been able to do her HEP due to recent breast surgery.      OBJECTIVE:     Sensation:  Denies purchase)  - cane flex and abd x 10  - AROM flex, abd, horiz abd/add x 10 B  - wall slides for flexion x 10      Self-Care  (10 minutes):   Management/Education: Reviewed Lymphedema risks after breast cancer surgery; informed patient of lymphedema precauti treatment limitation: chemotherapy  Rehab Potential:good    Patient/Family/Caregiver was advised of these findings, precautions, and treatment options and has agreed to actively participate in planning and for this course of care.     Thank you for your ref

## 2019-10-29 ENCOUNTER — OFFICE VISIT (OUTPATIENT)
Dept: PHYSICAL THERAPY | Facility: HOSPITAL | Age: 74
End: 2019-10-29
Attending: SURGERY
Payer: MEDICARE

## 2019-10-29 PROCEDURE — 97110 THERAPEUTIC EXERCISES: CPT

## 2019-10-29 PROCEDURE — 97140 MANUAL THERAPY 1/> REGIONS: CPT

## 2019-10-29 NOTE — PROGRESS NOTES
Referring Physician: Dr. Mirella Barriga  Diagnosis: total mastectomy of left breast (Z90.12)     Date of onset: 9/24/19 Date of Service: 10/24/2019             Insurance (Authorized # of Visits): 10     Next MD/Plan Renewal Date: 10th visit or sooner   Authorizing negative     Measurements: Arm volume difference 3.43% LUE > RUE    Today’s Treatment and Response:   Date 10/24/2019  10/29/2019   Visit # 1/10  #2/10   Manual Therapy (15 minutes)  Provided STM and scar massage to left chest, axilla, and lateral trunk to compression sleeve tomorrow. Goals:   Goals (discussed and planned with patient involvement):       To be met in 10 visits:  1) Decrease swelling left trunk to [facility] to allow patient to wear bra comfortably for at least 8 hours  2) Pt to be independent wit

## 2019-10-29 NOTE — PATIENT INSTRUCTIONS
PULLEY Shoulder FLexion:  10-15 reps, hold position for a few seconds       CESAR shoulder abduction:  10-15 reps, hold position for a few seconds

## 2019-10-31 ENCOUNTER — OFFICE VISIT (OUTPATIENT)
Dept: PHYSICAL THERAPY | Facility: HOSPITAL | Age: 74
End: 2019-10-31
Attending: SURGERY
Payer: MEDICARE

## 2019-10-31 PROCEDURE — 97140 MANUAL THERAPY 1/> REGIONS: CPT

## 2019-10-31 PROCEDURE — 97110 THERAPEUTIC EXERCISES: CPT

## 2019-10-31 NOTE — PROGRESS NOTES
Referring Physician: Dr. Flower Sullivan  Diagnosis: total mastectomy of left breast (Z90.12)     Date of onset: 9/24/19 Date of Service: 10/24/2019             Insurance (Authorized # of Visits): 10     Next MD/Plan Renewal Date: 10th visit or sooner   Authorizing her surgery     Posture: flexed     Palpation: tenderness left chest     Edema/Tissue Observations:    - \"dog ear\" lateral end of incision w/ mild swelling  - mild left trunk swelling  - no signs or symptoms of LUE swelling     Stemmer's Sign: negative seconds. Wall wash circles LUE CW/CCW x 10 each  *Discussed with patient that she can try the ROM exercises on her R arm at home if she feels that it may help.      Self-Care  (10 minutes):   Management/Education: Reviewed Lymphedema risks after breast can Charges: MM3, Ex1  Total Timed Treatment: 55 min  Total Treatment Time: 55 min

## 2019-11-04 ENCOUNTER — APPOINTMENT (OUTPATIENT)
Dept: GENERAL RADIOLOGY | Age: 74
End: 2019-11-04
Attending: EMERGENCY MEDICINE
Payer: MEDICARE

## 2019-11-04 ENCOUNTER — HOSPITAL ENCOUNTER (OUTPATIENT)
Age: 74
Discharge: HOME | End: 2019-11-04
Attending: EMERGENCY MEDICINE
Payer: MEDICARE

## 2019-11-04 VITALS
BODY MASS INDEX: 27 KG/M2 | DIASTOLIC BLOOD PRESSURE: 77 MMHG | TEMPERATURE: 99 F | HEART RATE: 68 BPM | SYSTOLIC BLOOD PRESSURE: 140 MMHG | WEIGHT: 150 LBS | RESPIRATION RATE: 18 BRPM | OXYGEN SATURATION: 100 %

## 2019-11-04 DIAGNOSIS — J01.40 ACUTE NON-RECURRENT PANSINUSITIS: Primary | ICD-10-CM

## 2019-11-04 PROCEDURE — 99214 OFFICE O/P EST MOD 30 MIN: CPT

## 2019-11-04 PROCEDURE — 71046 X-RAY EXAM CHEST 2 VIEWS: CPT | Performed by: EMERGENCY MEDICINE

## 2019-11-04 PROCEDURE — 99213 OFFICE O/P EST LOW 20 MIN: CPT

## 2019-11-04 RX ORDER — AZITHROMYCIN 250 MG/1
TABLET, FILM COATED ORAL
Qty: 1 PACKAGE | Refills: 0 | Status: SHIPPED | OUTPATIENT
Start: 2019-11-04 | End: 2019-12-06

## 2019-11-04 NOTE — ED PROVIDER NOTES
[facility]      History   No chief complaint on file.     Stated Complaint: FEVER SINUS CONGESTED     HPI    The patient is a 46-year-old female with a past history of breast cancer, status post mastectomy [address]   • NEEDLE BIOPSY LEFT  2016   •       1 no dm   • OTHER SURGICAL HISTORY      urethra polyp    • OTHER SURGICAL HISTORY      colposcopy ascus 2006 fibroids dx   • OTHER SURGICAL HISTORY  2019    left breast mastectomy consistent with sinusitis. Will initiate treatment with Zithromax. The patient is agreeable.       MDM     Viral URI versus sinusitis              Disposition and Plan     Clinical Impression:  Acute non-recurrent pansinusitis  (primary encounter diagnosi

## 2019-11-04 NOTE — ED INITIAL ASSESSMENT (HPI)
PATIENT IS CONCERNED THAT SHE MAY HAVE A SINUS INFECTION. REPORTS T MAX 99.6 ON Friday, Saturday AND Sunday.  + NASAL CONGESTION, COUGH AND POST NASAL DRIP. STATES COUGH IS MINIMALLY PRODUCTIVE.   REPORTS HX OF PNEUMONIA IN THE SUMMER.  + EAR CONGESTION A

## 2019-11-05 ENCOUNTER — OFFICE VISIT (OUTPATIENT)
Dept: PHYSICAL THERAPY | Facility: HOSPITAL | Age: 74
End: 2019-11-05
Attending: SURGERY
Payer: MEDICARE

## 2019-11-05 PROCEDURE — 97140 MANUAL THERAPY 1/> REGIONS: CPT

## 2019-11-05 NOTE — PROGRESS NOTES
Referring Physician: Dr. Chrsiten Oro  Diagnosis: total mastectomy of left breast (Z90.12)     Date of onset: 9/24/19 Date of Service: 10/24/2019             Insurance (Authorized # of Visits): 10     Next MD/Plan Renewal Date: 10th visit or sooner   Authorizing left chest     Edema/Tissue Observations:    - \"dog ear\" lateral end of incision w/ mild swelling  - mild left trunk swelling  - no signs or symptoms of LUE swelling     Stemmer's Sign: negative     Measurements: Arm volume difference 3.43% LUE > RUE slides for flexion x 10     There Ex (10 minutes)  Reviewed OH pulleys. Pt bought pulleys and will start using them at home today.   Printed out picture of OH pulley flexion and abduction (see pt instructions)  Provided PROM to LUE during MLD flexion and a dressing/bathing/and reaching overhead  4) Increase UE strength to Left 4+/5, Right 4-/5 to improve ease of lifting and performing household chores    Plan:   Frequency / Duration: Patient will be seen for 2 x/week or a total of 10 visits over a 90 day per

## 2019-11-07 ENCOUNTER — OFFICE VISIT (OUTPATIENT)
Dept: PHYSICAL THERAPY | Facility: HOSPITAL | Age: 74
End: 2019-11-07
Attending: SURGERY
Payer: MEDICARE

## 2019-11-07 PROCEDURE — 97110 THERAPEUTIC EXERCISES: CPT

## 2019-11-07 PROCEDURE — 97140 MANUAL THERAPY 1/> REGIONS: CPT

## 2019-11-07 NOTE — PROGRESS NOTES
Referring Physician: Dr. Jerson Chavez  Diagnosis: total mastectomy of left breast (Z90.12)     Date of onset: 9/24/19 Date of Service: 10/24/2019             Insurance (Authorized # of Visits): 10     Next MD/Plan Renewal Date: 10th visit or sooner   Authorizing R from injury)                            IR: L T8, R T 9                            ER: L 80, L 80                Strength shld: left shld grossly 4/5, right shld grossly 3+/5  * patient reporting right shld limitations due to injury last Feb 2019 but fee capture L lateral trunk swelling and scar. Pt also has new compression sleeve and wanted to review donning/doffing. Pt able to demonstrate donning/doffing new OTS compression sleeve and gauntlet. Good fit noted.  Manual Therapy (45 minutes)  Provided STM the event she feels she is developing swelling. Discussed  various vendors that can be utilized. 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  Patient wanting to use [address] for vendor.

## 2019-11-13 ENCOUNTER — OFFICE VISIT (OUTPATIENT)
Dept: PHYSICAL THERAPY | Facility: HOSPITAL | Age: 74
End: 2019-11-13
Attending: SURGERY
Payer: MEDICARE

## 2019-11-13 PROCEDURE — 97110 THERAPEUTIC EXERCISES: CPT | Performed by: PHYSICAL THERAPIST

## 2019-11-13 PROCEDURE — 97140 MANUAL THERAPY 1/> REGIONS: CPT | Performed by: PHYSICAL THERAPIST

## 2019-11-13 NOTE — PROGRESS NOTES
Referring Physician: Dr. Flower Sullivan  Diagnosis: total mastectomy of left breast (Z90.12)     Date of onset: 9/24/19 Date of Service: 10/24/2019             Insurance (Authorized # of Visits): 10     Next MD/Plan Renewal Date: 10th visit or sooner   Authorizing Abd: L 125, R  128 (decreased R from injury)                             Sensation:  Denies numbness and/or tingling     Evaluation measurements:  AROM/Strength:                Shoulder AROM:                                        Flex: L 115, R 125 (decr next PT session to be assessed. Fabricated a foam pad for L trunk area. Pt to try it out over the weekend as needed. Manual therapy (25 minutes)  Deferred massage and MLD until pt on antibiotics for 3-5 days.           Compression:  Pt has questions re: h extension and B ER w/green band. Difficulty with RUE shoulder ER. Issued handout with pictures and written instructions.  Therex (15 minutes)    Supine stretching L shoulder  Doorway stretches x 6  Supine cane stretch flex x 10  Prone rhomb x 10  Prone mi Treatment Time: 45 min

## 2019-11-15 ENCOUNTER — APPOINTMENT (OUTPATIENT)
Dept: PHYSICAL THERAPY | Facility: HOSPITAL | Age: 74
End: 2019-11-15
Attending: SURGERY
Payer: MEDICARE

## 2019-11-15 PROBLEM — Z91.89 AT RISK FOR DEHYDRATION: Status: ACTIVE | Noted: 2019-11-15

## 2019-11-18 ENCOUNTER — OFFICE VISIT (OUTPATIENT)
Dept: PHYSICAL THERAPY | Facility: HOSPITAL | Age: 74
End: 2019-11-18
Attending: SURGERY
Payer: MEDICARE

## 2019-11-18 PROCEDURE — 97110 THERAPEUTIC EXERCISES: CPT | Performed by: PHYSICAL THERAPIST

## 2019-11-18 PROCEDURE — 97140 MANUAL THERAPY 1/> REGIONS: CPT | Performed by: PHYSICAL THERAPIST

## 2019-11-18 NOTE — PROGRESS NOTES
Referring Physician: Dr. Terry Lee  Diagnosis: total mastectomy of left breast (Z90.12)     Date of onset: 9/24/19 Date of Service: 10/24/2019             Insurance (Authorized # of Visits): 10     Next MD/Plan Renewal Date: 10th visit or sooner   Authorizing Flex: L 150, R 130 (decreased R from injury)                            Abd: L 140, R  128 (decreased R from injury)       10/31/2019  AROM/Strength:                Shoulder AROM:                                        Flex: L 128, R 130 (decrease swelling          Compression:  Pt has her compression sleeve and will wear it as needed. Discussed use if her arm feels achy, tired or sore. Especially when doing air travel.  Manual Therapy (30 minutes)  Provided STM and scar massage to left chest, axil strength to Left 4+/5, Right 4-/5 to improve ease of lifting and performing household chores - met for left arm    Plan:   Continue w/ ROM/stretching, strengthening, Complete Decongestive Therapy  Progress towards self management over the next couple of vi

## 2019-11-21 ENCOUNTER — OFFICE VISIT (OUTPATIENT)
Dept: PHYSICAL THERAPY | Facility: HOSPITAL | Age: 74
End: 2019-11-21
Attending: SURGERY
Payer: MEDICARE

## 2019-11-21 PROCEDURE — 97140 MANUAL THERAPY 1/> REGIONS: CPT | Performed by: PHYSICAL THERAPIST

## 2019-11-21 PROCEDURE — 97110 THERAPEUTIC EXERCISES: CPT | Performed by: PHYSICAL THERAPIST

## 2019-11-21 NOTE — PROGRESS NOTES
Referring Physician: Dr. Jaron Tejada  Diagnosis: total mastectomy of left breast (Z90.12)     Date of onset: 9/24/19 Date of Service: 10/24/2019             Insurance (Authorized # of Visits): 10     Next MD/Plan Renewal Date: 10th visit or sooner   Authorizing R  135 (decreased R from injury)   Strength:     Right shld grossly 3+/5, unable to tolerate resistence for MMD    L shld grossly 4+/5    Tissue mobility:   - tightness B pect  - left chest tissue and scar mobility mild restriction but improving    Swellin fabricated foam pad made at last session. Reviewed how to wear foam pad to help capture L lateral trunk swelling and scar. Pt also has new compression sleeve and wanted to review donning/doffing.   Pt able to demonstrate donning/doffing new OTS compressio no change in ROM since last session. Patient to attempt self management  Goals:   Goals (discussed and planned with patient involvement):       To be met in 10 visits:  1) Decrease swelling left trunk to [facility] to allow patient to wear bra comfortably for at l

## 2019-12-19 ENCOUNTER — OFFICE VISIT (OUTPATIENT)
Dept: PHYSICAL THERAPY | Facility: HOSPITAL | Age: 74
End: 2019-12-19
Attending: SURGERY
Payer: MEDICARE

## 2019-12-19 PROCEDURE — 97140 MANUAL THERAPY 1/> REGIONS: CPT

## 2019-12-19 NOTE — PROGRESS NOTES
Referring Physician: Dr. Michaela Brice  Diagnosis: total mastectomy of left breast (Z90.12)     Date of onset: 9/24/19 Date of Service: 10/24/2019             Insurance (Authorized # of Visits): 10     Next MD/Plan Renewal Date: 10th visit or sooner   Authorizing 4+/5    Tissue mobility: decreased L pect tendon at end range scaption   No swelling noted axilla, chest, or arm. Pt wears trunk compression pad as needed.         11/18/19  AROM/Strength:                Shoulder AROM: limitations due to injury last Feb 2019 but feels her motion and strength has regressed since her surgery     Posture: flexed     Palpation: tenderness left chest     Edema/Tissue Observations:    - \"dog ear\" lateral end of incision w/ mild swelling  - m CW/CCW x 10 each  Added:   Band exercises for B shoulder rows, B shoulder extension and B ER w/green band. Difficulty with RUE shoulder ER. Issued handout with pictures and written instructions.  Therex (15 minutes)    Supine stretching L shoulder  Doorwa met.          Charges: MM2   Total Timed Treatment: 25 min  Total Treatment Time: 25 min

## 2020-01-15 ENCOUNTER — HOSPITAL ENCOUNTER (OUTPATIENT)
Age: 75
Discharge: HOME | End: 2020-01-15
Attending: EMERGENCY MEDICINE
Payer: MEDICARE

## 2020-01-15 VITALS
HEART RATE: 65 BPM | DIASTOLIC BLOOD PRESSURE: 70 MMHG | SYSTOLIC BLOOD PRESSURE: 156 MMHG | WEIGHT: 144 LBS | HEIGHT: 63 IN | BODY MASS INDEX: 25.52 KG/M2 | RESPIRATION RATE: 20 BRPM | OXYGEN SATURATION: 99 % | TEMPERATURE: 98 F

## 2020-01-15 DIAGNOSIS — J01.90 ACUTE SINUSITIS, RECURRENCE NOT SPECIFIED, UNSPECIFIED LOCATION: Primary | ICD-10-CM

## 2020-01-15 PROCEDURE — 99213 OFFICE O/P EST LOW 20 MIN: CPT

## 2020-01-15 PROCEDURE — 99214 OFFICE O/P EST MOD 30 MIN: CPT

## 2020-01-15 RX ORDER — AZITHROMYCIN 250 MG/1
TABLET, FILM COATED ORAL
Qty: 1 PACKAGE | Refills: 0 | Status: SHIPPED | OUTPATIENT
Start: 2020-01-15 | End: 2020-01-20

## 2020-01-15 NOTE — ED INITIAL ASSESSMENT (HPI)
Sick for 3 days with sinus congestion. Fever yesterday. Denies sore throat. C/o headache and facial pressure.

## 2020-01-15 NOTE — ED PROVIDER NOTES
[facility]      History   Patient presents with:  Cough/URI    Stated Complaint: possible sinus and strep    HPI    Patient presents to the immediate care center complaining of low-grade fevers and sinus reviewed with patient/caregiver and is not pertinent to presenting problem.     Social History    Tobacco Use      Smoking status: Never Smoker      Smokeless tobacco: Never Used      Tobacco comment: caffeine 2     Alcohol use: No      Alcohol/week: 0.0 st tenderness. Musculoskeletal: Normal range of motion. General: No tenderness. Skin:     General: Skin is warm and dry. Findings: No rash. Neurological:      Mental Status: She is alert and oriented to person, place, and time.

## 2020-06-09 PROBLEM — J18.9 PNEUMONIA OF LEFT LOWER LOBE DUE TO INFECTIOUS ORGANISM: Status: RESOLVED | Noted: 2019-06-26 | Resolved: 2020-06-09

## 2020-06-09 PROBLEM — D70.9 NEUTROPENIC FEVER  (HCC): Status: RESOLVED | Noted: 2019-06-26 | Resolved: 2020-06-09

## 2020-06-09 PROBLEM — D70.9 NEUTROPENIC FEVER (HCC): Status: RESOLVED | Noted: 2019-06-26 | Resolved: 2020-06-09

## 2020-06-09 PROBLEM — R50.81 NEUTROPENIC FEVER  (HCC): Status: RESOLVED | Noted: 2019-06-26 | Resolved: 2020-06-09

## 2020-06-09 PROBLEM — R50.81 NEUTROPENIC FEVER (HCC): Status: RESOLVED | Noted: 2019-06-26 | Resolved: 2020-06-09

## 2020-06-09 PROBLEM — D70.9 NEUTROPENIC FEVER: Status: RESOLVED | Noted: 2019-06-26 | Resolved: 2020-06-09

## 2020-06-09 PROBLEM — R50.81 NEUTROPENIC FEVER: Status: RESOLVED | Noted: 2019-06-26 | Resolved: 2020-06-09

## 2020-10-06 ENCOUNTER — HOSPITAL ENCOUNTER (OUTPATIENT)
Age: 75
Discharge: HOME | End: 2020-10-06
Attending: EMERGENCY MEDICINE
Payer: MEDICARE

## 2020-10-06 VITALS
SYSTOLIC BLOOD PRESSURE: 135 MMHG | DIASTOLIC BLOOD PRESSURE: 65 MMHG | OXYGEN SATURATION: 98 % | HEART RATE: 74 BPM | RESPIRATION RATE: 18 BRPM | TEMPERATURE: 99 F

## 2020-10-06 DIAGNOSIS — B34.9 VIRAL SYNDROME: Primary | ICD-10-CM

## 2020-10-06 PROCEDURE — 99213 OFFICE O/P EST LOW 20 MIN: CPT

## 2020-10-06 PROCEDURE — 87430 STREP A AG IA: CPT

## 2020-10-06 NOTE — ED PROVIDER NOTES
Patient Seen in: [facility]      History   Patient presents with:  Testing    Stated Complaint: covid test    HPI    The patient is a 29-year-old female with a past history of breast cancer, hypertension who presents now left breast mastectomy   • TONSILLECTOMY                  Family history reviewed with patient/caregiver and is not pertinent to presenting problem.     Social History    Tobacco Use      Smoking status: Never Smoker      Smokeless tobacco: Never Used syndrome  (primary encounter diagnosis)    Disposition:  Discharge  10/6/2020  6:27 pm    Follow-up:  Jerman Austin MD  [address] [place] [address]  [phone]      As needed          Medications Prescribed:  Current Discharge Medication List

## 2020-10-06 NOTE — ED INITIAL ASSESSMENT (HPI)
C/o \"scratchy throat\" and temp of 99. Mild congestion. No cough. Denies chest pain. Took tylenol at 5 pm. Requests testing.

## 2021-04-11 DIAGNOSIS — Z23 NEED FOR VACCINATION: ICD-10-CM

## 2022-04-05 ENCOUNTER — HOSPITAL ENCOUNTER (OUTPATIENT)
Age: 77
Discharge: HOME | End: 2022-04-05
Attending: EMERGENCY MEDICINE
Payer: MEDICARE

## 2022-04-05 VITALS
OXYGEN SATURATION: 97 % | RESPIRATION RATE: 18 BRPM | DIASTOLIC BLOOD PRESSURE: 80 MMHG | TEMPERATURE: 98 F | HEART RATE: 96 BPM | SYSTOLIC BLOOD PRESSURE: 151 MMHG

## 2022-04-05 DIAGNOSIS — J06.9 VIRAL URI: Primary | ICD-10-CM

## 2022-04-05 LAB
S PYO AG THROAT QL: NEGATIVE
SARS-COV-2 RNA RESP QL NAA+PROBE: NOT DETECTED

## 2022-04-05 PROCEDURE — 87880 STREP A ASSAY W/OPTIC: CPT

## 2022-04-05 PROCEDURE — 99212 OFFICE O/P EST SF 10 MIN: CPT

## 2022-04-05 PROCEDURE — 99213 OFFICE O/P EST LOW 20 MIN: CPT

## 2022-04-05 NOTE — ED INITIAL ASSESSMENT (HPI)
PATIENT ARRIVED AMBULATORY TO ROOM C/O SYMPTOMS THAT STARTED 2 DAYS AGO. +NON PRODUCTIVE COUGH. +NASAL CONGESTION. NO FEVERS.  EASY NON LABORED RESPIRATIONS

## 2022-04-08 ENCOUNTER — HOSPITAL ENCOUNTER (OUTPATIENT)
Age: 77
Discharge: HOME | End: 2022-04-08
Attending: EMERGENCY MEDICINE
Payer: MEDICARE

## 2022-04-08 VITALS
DIASTOLIC BLOOD PRESSURE: 66 MMHG | OXYGEN SATURATION: 99 % | HEART RATE: 97 BPM | RESPIRATION RATE: 18 BRPM | TEMPERATURE: 98 F | SYSTOLIC BLOOD PRESSURE: 117 MMHG

## 2022-04-08 DIAGNOSIS — J06.9 UPPER RESPIRATORY TRACT INFECTION, UNSPECIFIED TYPE: Primary | ICD-10-CM

## 2022-04-08 PROCEDURE — 99213 OFFICE O/P EST LOW 20 MIN: CPT

## 2022-04-08 RX ORDER — ALBUTEROL SULFATE 90 UG/1
2 AEROSOL, METERED RESPIRATORY (INHALATION) EVERY 4 HOURS PRN
Qty: 18 G | Refills: 0 | Status: SHIPPED | OUTPATIENT
Start: 2022-04-08 | End: 2022-05-08

## 2022-04-08 RX ORDER — BENZONATATE 100 MG/1
100 CAPSULE ORAL 3 TIMES DAILY PRN
Qty: 30 CAPSULE | Refills: 0 | Status: SHIPPED | OUTPATIENT
Start: 2022-04-08 | End: 2022-05-08

## 2022-04-08 NOTE — ED INITIAL ASSESSMENT (HPI)
Nasal and chest congestion x 1 week. Was COVID-19 negative on Monday. Chest tightness with coughing. Denies fevers or sore throat.

## 2022-06-07 ENCOUNTER — HOSPITAL ENCOUNTER (OUTPATIENT)
Age: 77
Discharge: HOME | End: 2022-06-07
Payer: MEDICARE

## 2022-06-07 VITALS
SYSTOLIC BLOOD PRESSURE: 155 MMHG | TEMPERATURE: 99 F | OXYGEN SATURATION: 100 % | DIASTOLIC BLOOD PRESSURE: 84 MMHG | HEART RATE: 89 BPM | RESPIRATION RATE: 20 BRPM

## 2022-06-07 DIAGNOSIS — J31.0 RHINOSINUSITIS: Primary | ICD-10-CM

## 2022-06-07 DIAGNOSIS — J32.9 RHINOSINUSITIS: Primary | ICD-10-CM

## 2022-06-07 PROBLEM — R26.81 UNSTEADINESS ON FEET: Status: ACTIVE | Noted: 2019-07-12

## 2022-06-07 PROBLEM — I10 ESSENTIAL (PRIMARY) HYPERTENSION: Status: ACTIVE | Noted: 2019-07-12

## 2022-06-07 PROBLEM — M62.81 MUSCLE WEAKNESS (GENERALIZED): Status: ACTIVE | Noted: 2019-07-12

## 2022-06-07 PROBLEM — A04.72 ENTEROCOLITIS DUE TO CLOSTRIDIUM DIFFICILE, NOT SPECIFIED AS RECURRENT: Status: ACTIVE | Noted: 2019-07-12

## 2022-06-07 LAB — SARS-COV-2 RNA RESP QL NAA+PROBE: NOT DETECTED

## 2022-06-07 PROCEDURE — 99213 OFFICE O/P EST LOW 20 MIN: CPT

## 2022-06-07 RX ORDER — BENZONATATE 100 MG/1
100 CAPSULE ORAL 3 TIMES DAILY PRN
Qty: 30 CAPSULE | Refills: 0 | Status: SHIPPED | OUTPATIENT
Start: 2022-06-07

## 2022-07-24 NOTE — PROGRESS NOTES
[facility] Note:  Renal Dose Adjustment for Enoxaparin (LOVENOX)    Alfonso Key has been prescribed Enoxaparin (LOVENOX) 40 mg subcutaneously every 24 hours. Estimated Creatinine Clearance: 28 mL/min (A) (based on SCr of 1.5 mg/dL (H)).     Her calc with rolling walker/fair balance

## 2022-08-19 ENCOUNTER — HOSPITAL ENCOUNTER (OUTPATIENT)
Age: 77
Discharge: HOME | End: 2022-08-19
Payer: MEDICARE

## 2022-08-19 VITALS
DIASTOLIC BLOOD PRESSURE: 87 MMHG | TEMPERATURE: 98 F | RESPIRATION RATE: 18 BRPM | HEART RATE: 72 BPM | OXYGEN SATURATION: 98 % | SYSTOLIC BLOOD PRESSURE: 129 MMHG

## 2022-08-19 DIAGNOSIS — B02.9 HERPES ZOSTER WITHOUT COMPLICATION: Primary | ICD-10-CM

## 2022-08-19 PROCEDURE — 99213 OFFICE O/P EST LOW 20 MIN: CPT

## 2022-08-19 RX ORDER — VALACYCLOVIR HYDROCHLORIDE 1 G/1
1000 TABLET, FILM COATED ORAL 3 TIMES DAILY
Qty: 21 TABLET | Refills: 0 | Status: SHIPPED | OUTPATIENT
Start: 2022-08-19 | End: 2022-08-26

## 2022-08-19 NOTE — ED INITIAL ASSESSMENT (HPI)
PATIENT ARRIVED AMBULATORY TO ROOM. +RASH TO THE RIGHT SHOULDER/UPPER CHEST. PATIENT NOTICED THE RASH 3 DAYS AGO. +BURNING TO THE AREA. NO FEVERS. NO DRAINAGE.

## 2023-05-17 ENCOUNTER — HOSPITAL ENCOUNTER (OUTPATIENT)
Age: 78
Discharge: HOME | End: 2023-05-17
Payer: MEDICARE

## 2023-05-17 VITALS
DIASTOLIC BLOOD PRESSURE: 87 MMHG | TEMPERATURE: 98 F | OXYGEN SATURATION: 98 % | SYSTOLIC BLOOD PRESSURE: 141 MMHG | HEART RATE: 90 BPM | RESPIRATION RATE: 16 BRPM

## 2023-05-17 DIAGNOSIS — R21 RASH AND NONSPECIFIC SKIN ERUPTION: Primary | ICD-10-CM

## 2023-05-17 PROCEDURE — 99212 OFFICE O/P EST SF 10 MIN: CPT

## 2023-05-17 PROCEDURE — 99213 OFFICE O/P EST LOW 20 MIN: CPT

## 2023-05-17 NOTE — DISCHARGE INSTRUCTIONS
Continue over-the-counter topical hydrocortisone cream and over-the-counter 24-hour Zyrtec or Claritin to help with itchy skin. If you develop a full body rash pus or drainage from the skin fevers or chills or cluster of a rash that is concerning for shingles you may return to the [facility] otherwise follow-up with your primary care provider.

## 2023-05-17 NOTE — ED INITIAL ASSESSMENT (HPI)
Pt with red round rash located first on her left lower leg and heel, then one developed on right foot. Pt states there are 3 on her right hip area. C/o itching. Denies pain/fever.

## 2023-10-12 ENCOUNTER — HOSPITAL ENCOUNTER (OUTPATIENT)
Age: 78
Discharge: HOME | End: 2023-10-12
Payer: MEDICARE

## 2023-10-12 VITALS
DIASTOLIC BLOOD PRESSURE: 99 MMHG | SYSTOLIC BLOOD PRESSURE: 119 MMHG | HEART RATE: 97 BPM | RESPIRATION RATE: 18 BRPM | OXYGEN SATURATION: 96 % | TEMPERATURE: 98 F

## 2023-10-12 DIAGNOSIS — N30.90 CYSTITIS: Primary | ICD-10-CM

## 2023-10-12 LAB
BILIRUB UR QL STRIP: NEGATIVE
COLOR UR: YELLOW
GLUCOSE UR STRIP-MCNC: NEGATIVE MG/DL
KETONES UR STRIP-MCNC: NEGATIVE MG/DL
NITRITE UR QL STRIP: NEGATIVE
PH UR STRIP: 5.5 [PH]
PROT UR STRIP-MCNC: NEGATIVE MG/DL
SP GR UR STRIP: 1.02
UROBILINOGEN UR STRIP-ACNC: <2 MG/DL

## 2023-10-12 PROCEDURE — 99214 OFFICE O/P EST MOD 30 MIN: CPT

## 2023-10-12 PROCEDURE — 87077 CULTURE AEROBIC IDENTIFY: CPT | Performed by: PHYSICIAN ASSISTANT

## 2023-10-12 PROCEDURE — 81002 URINALYSIS NONAUTO W/O SCOPE: CPT

## 2023-10-12 PROCEDURE — 87088 URINE BACTERIA CULTURE: CPT | Performed by: PHYSICIAN ASSISTANT

## 2023-10-12 PROCEDURE — 87086 URINE CULTURE/COLONY COUNT: CPT | Performed by: PHYSICIAN ASSISTANT

## 2023-10-12 PROCEDURE — 87186 SC STD MICRODIL/AGAR DIL: CPT | Performed by: PHYSICIAN ASSISTANT

## 2023-10-12 RX ORDER — PHENAZOPYRIDINE HYDROCHLORIDE 200 MG/1
200 TABLET, FILM COATED ORAL ONCE
Status: COMPLETED | OUTPATIENT
Start: 2023-10-12 | End: 2023-10-12

## 2023-10-12 RX ORDER — CEFADROXIL 500 MG/1
500 CAPSULE ORAL 2 TIMES DAILY
Qty: 14 CAPSULE | Refills: 0 | Status: SHIPPED | OUTPATIENT
Start: 2023-10-12 | End: 2023-10-19

## 2023-10-12 RX ORDER — PHENAZOPYRIDINE HYDROCHLORIDE 200 MG/1
200 TABLET, FILM COATED ORAL 3 TIMES DAILY PRN
Qty: 10 TABLET | Refills: 0 | Status: SHIPPED | OUTPATIENT
Start: 2023-10-12 | End: 2023-10-17

## 2023-10-12 NOTE — ED INITIAL ASSESSMENT (HPI)
Patient arrived ambulatory to room c/o urinary symptoms that started 3 days ago. Symptoms have been worsening. +frequency/urgency. +burning with urinating. No fevers. No hematuria. No n/v/d. Easy non labored respirations.  No distress

## 2024-01-08 ENCOUNTER — HOSPITAL ENCOUNTER (OUTPATIENT)
Age: 79
Discharge: HOME | End: 2024-01-08
Payer: MEDICARE

## 2024-01-08 VITALS
RESPIRATION RATE: 16 BRPM | HEART RATE: 56 BPM | SYSTOLIC BLOOD PRESSURE: 137 MMHG | OXYGEN SATURATION: 97 % | TEMPERATURE: 98 F | DIASTOLIC BLOOD PRESSURE: 70 MMHG

## 2024-01-08 DIAGNOSIS — J01.90 ACUTE SINUSITIS, RECURRENCE NOT SPECIFIED, UNSPECIFIED LOCATION: Primary | ICD-10-CM

## 2024-01-08 PROCEDURE — 99213 OFFICE O/P EST LOW 20 MIN: CPT

## 2024-01-08 RX ORDER — CEFPODOXIME PROXETIL 200 MG/1
200 TABLET, FILM COATED ORAL 2 TIMES DAILY
Qty: 14 TABLET | Refills: 0 | Status: SHIPPED | OUTPATIENT
Start: 2024-01-08 | End: 2024-01-15

## 2024-01-08 NOTE — DISCHARGE INSTRUCTIONS
Complete entire course of antibiotic for sinus infection as directed   Drink plenty of water and get plenty of rest   You may benefit from taking a decongestant (e.g. Sudafed) and nasal spray (e.g. Flonase)  You may benefit from taking a daily allergy medication (e.g. Zyrtec)  You may benefit from using a humidifier    Alternate Tylenol and Motrin every 3 hours for pain or fever > 100.4 degrees    Sleep with head elevated and avoid laying flat  Avoid having air blow on your face    Wash hands often  Disinfect your environment  Do not share utensils or drinks    Symptoms may take a few weeks to resolve  Follow up with your primary care provider

## 2024-01-08 NOTE — ED INITIAL ASSESSMENT (HPI)
Presents with sinus congestion and headache for 2 weeks. No cough. \"Blood streaks\" in mucous. Low grade temp. Declines covid testing.

## 2024-01-08 NOTE — ED PROVIDER NOTES
Chief Complaint   Patient presents with    Sinus Problem       History obtained from: patient   services not used    HPI:     Tanna Steele is a 78 year old female who presents with nasal congestion and sinus pressure x 2 weeks. Patient endorses thick, yellow mucus from nose and few streaks of blood when blowing nose. Patient has been taking Sudafed with some improvement. Patient states she has a history of sinus infections with similar symptoms. Denies fever, chills, cough, chest pain, shortness of breath, hemoptysis, abdominal pain, vomiting, diarrhea, rash, eye pain or pain with eye movements.     PMH  Past Medical History:   Diagnosis Date    Anxiety state     Breast cancer (HCC) 2019    Left breast IDC DCIS    Breast injury 2016    left breast     Esophageal reflux     Essential hypertension     GERD     Osteoarthritis, knee     PONV (postoperative nausea and vomiting)     SEASONAL ALLERGIES     Visual impairment     wears glasses/contact       PFSH    PFSH asessment screens reviewed and agree.  Nurses notes reviewed I agree with documentation.    Family History   Problem Relation Age of Onset    Other (Other) Father          staph infection after fall    Diabetes Mother     Other (Other) Mother     Other (Other) Other         muscular dystrophy 2  genetic    Heart Disease Sister 65    Colon Cancer Maternal Grandmother         colon cancer    Cancer Maternal Grandmother         colon    Breast Cancer Neg     Thyroid disease Neg      Family history reviewed with patient/caregiver and is not pertinent to presenting problem.  Social History     Socioeconomic History    Marital status:      Spouse name: Not on file    Number of children: Not on file    Years of education: Not on file    Highest education level: Not on file   Occupational History    Not on file   Tobacco Use    Smoking status: Never    Smokeless tobacco: Never    Tobacco comments:     caffeine 2    Vaping Use    Vaping Use:  Never used   Substance and Sexual Activity    Alcohol use: No     Alcohol/week: 0.0 standard drinks of alcohol     Comment: socially    Drug use: No    Sexual activity: Not Currently   Other Topics Concern    Not on file   Social History Narrative    Lives alone.  Retired.  Son in the area.  Good social network.  No grandchildren.     Social Determinants of Health     Financial Resource Strain: Not on file   Food Insecurity: Not on file   Transportation Needs: Not on file   Physical Activity: Not on file   Stress: Not on file   Social Connections: Not on file   Housing Stability: Not on file         ROS:   Positive for stated complaint: nasal congestion, sinus pressure  All other systems reviewed and negative except as noted above.  Constitutional and Vital Signs Reviewed.      Physical Exam:     Findings:    /70   Pulse 56   Temp 98.1 °F (36.7 °C) (Temporal)   Resp 16   SpO2 97%   GENERAL: well developed, no acute distress, non-toxic appearing   SKIN: good skin turgor, no obvious rashes  HEAD: normocephalic, atraumatic  EYES: sclera non-icteric bilaterally, conjunctiva clear  EARS: TMs clear bilaterally, canals clear  NOSE: nasal congestion, bilateral maxillary and frontal sinus tenderness   THROAT: clear, without exudates or swelling, uvula midline, airway patent  NECK: supple, no adenopathy, no nuchal rigidity, no trismus   CARDIO: RRR without murmur  LUNGS: clear to auscultation bilaterally, no increased WOB, no rales, rhonchi, or wheezes  EXTREMITIES: no cyanosis or edema, LUTZ without difficulty  GI: soft, non-tender  NEURO: no focal deficits  PSYCH: alert and oriented x3.  Answering questions appropriately.  Mood appropriate.    MDM/Assessment/Plan:   Orders for this encounter:    Orders Placed This Encounter    cefpodoxime 200 MG Oral Tab     Sig: Take 1 tablet (200 mg total) by mouth 2 (two) times daily for 7 days.     Dispense:  14 tablet     Refill:  0       Labs performed this visit:  No results  found for this or any previous visit (from the past 10 hour(s)).    Imaging performed this visit:  No orders to display       MDM:  DDx includes sinusitis versus viral URI versus allergic rhinitis versus other.  Patient is overall very well-appearing with stable vitals and tolerating oral intake.  Given consolation and chronicity of symptoms as well as patient's history of sinus infections, shared decision-making employed to treat sinus infection with antibiotics at this time.  Patient has extensive allergy list including penicillins however has tolerated cephalosporins in the past.  Patient has taken azithromycin in the past for sinus infections however discussed risk versus benefits of this medication given resistance patterns and discussed with patient that third-generation cephalosporin may be a better alternative however is often cost prohibitive.  Patient states she would like to try third-generation cephalosporin.  Rx cefpodoxime x 7 days.  Discussed supportive care including rest, increase fluid intake, and using a humidifier.  Instructed patient to go directly to nearest ER with any worsening or concerning symptoms.  Follow-up with PCP.    Diagnosis:    ICD-10-CM    1. Acute sinusitis, recurrence not specified, unspecified location  J01.90           All results reviewed and discussed with patient/patient's family. Patient/patient's family verbalize understanding of instructions. All of patient's/patient's family's questions were addressed.   See AVS for detailed discharge instructions for your condition today.    Follow Up with:  Mile Conway MD  [address] [facility]  [address]  [facility] [address] [phone]               Marybel Mcqueen PA-C

## 2024-05-12 ENCOUNTER — HOSPITAL ENCOUNTER (OUTPATIENT)
Age: 79
Discharge: HOME | End: 2024-05-12
Attending: EMERGENCY MEDICINE

## 2024-05-12 VITALS
TEMPERATURE: 98 F | DIASTOLIC BLOOD PRESSURE: 73 MMHG | HEART RATE: 94 BPM | OXYGEN SATURATION: 98 % | RESPIRATION RATE: 18 BRPM | SYSTOLIC BLOOD PRESSURE: 131 MMHG

## 2024-05-12 DIAGNOSIS — J06.9 VIRAL URI WITH COUGH: Primary | ICD-10-CM

## 2024-05-12 LAB
POCT INFLUENZA A: NEGATIVE
POCT INFLUENZA B: NEGATIVE
SARS-COV-2 RNA RESP QL NAA+PROBE: NOT DETECTED

## 2024-05-12 PROCEDURE — 99213 OFFICE O/P EST LOW 20 MIN: CPT

## 2024-05-12 PROCEDURE — 87502 INFLUENZA DNA AMP PROBE: CPT | Performed by: EMERGENCY MEDICINE

## 2024-05-12 RX ORDER — BENZONATATE 100 MG/1
100 CAPSULE ORAL 3 TIMES DAILY PRN
Qty: 30 CAPSULE | Refills: 0 | Status: SHIPPED | OUTPATIENT
Start: 2024-05-12 | End: 2024-06-11

## 2024-05-12 NOTE — ED PROVIDER NOTES
Patient Seen in: [facility] [place]      History     Chief Complaint   Patient presents with    Covid     Stated Complaint: cough, fever, faqtigue, congestion, headache, body ache    Subjective:   HPI    The patient is a 78-year-old female with past history of hypertension, breast cancer who presents now with cough, congestion, fatigue, headache.  The patient states that the symptoms started on Wednesday of last week.  Patient states she developed a fever yesterday.  The patient did a home COVID test which she interpreted to be positive, though requested repeat testing here to confirm.  Patient denies any shortness of breath.    Objective:   No pertinent past medical history.            No pertinent past surgical history.              No pertinent social history.            Review of Systems    Positive for stated complaint: cough, fever, faqtigue, congestion, headache, body ache  Other systems are as noted in HPI.  Constitutional and vital signs reviewed.      All other systems reviewed and negative except as noted above.    Physical Exam     ED Triage Vitals [05/12/24 0947]   /73   Pulse 94   Resp 18   Temp 98.4 °F (36.9 °C)   Temp src Temporal   SpO2 98 %   O2 Device None (Room air)       Current Vitals:   Vital Signs  BP: 131/73  Pulse: 94  Resp: 18  Temp: 98.4 °F (36.9 °C)  Temp src: Temporal    Oxygen Therapy  SpO2: 98 %  O2 Device: None (Room air)            Physical Exam    Constitutional: Well-developed well-nourished in no acute distress  Head: Normocephalic, no swelling or tenderness  Eyes: Nonicteric sclera, no conjunctival injection  ENT: TMs are clear and flat bilaterally.  There is no posterior pharyngeal erythema  Chest: Clear to auscultation, no tenderness  Cardiovascular: Regular rate and rhythm without murmur  Abdomen: Soft, nontender and nondistended  Neurologic: Patient is awake, alert and oriented ×3.  The patient's motor strength is 5 out of 5 and symmetric in the upper and lower  extremities bilaterally  Extremities: No focal swelling or tenderness  Skin: No pallor, no redness or warmth to the touch      ED Course     Probable viral etiology of the patient's symptoms  Labs Reviewed   POCT FLU TEST - Normal    Narrative:     This assay is a rapid molecular in vitro test utilizing nucleic acid amplification of influenza A and B viral RNA.   RAPID SARS-COV-2 BY PCR - Normal             Pulse ox is 98% on room air, normal.  Vital signs are stable  Patient's negative COVID, negative flu were reviewed with the patient.  Will initiate Tessalon Perles for persistent cough.       MDM      Viral syndrome versus influenza versus COVID                                   Medical Decision Making      Disposition and Plan     Clinical Impression:  1. Viral URI with cough         Disposition:  Discharge  5/12/2024 10:43 am    Follow-up:  Mile Conway MD  [address] [facility]  [address]  [facility] [address] [phone]      As needed          Medications Prescribed:  Current Discharge Medication List        START taking these medications    Details   !! benzonatate 100 MG Oral Cap Take 1 capsule (100 mg total) by mouth 3 (three) times daily as needed for cough.  Qty: 30 capsule, Refills: 0       !! - Potential duplicate medications found. Please discuss with provider.

## 2024-05-12 NOTE — ED INITIAL ASSESSMENT (HPI)
Patient with sx of congestion on Wednesday, fever of 100.5 yesterday. + home COVID test yesterday  Here with congestion and cough   Would like Tessalon pearls

## 2024-05-24 ENCOUNTER — HOSPITAL ENCOUNTER (OUTPATIENT)
Age: 79
Discharge: HOME | End: 2024-05-24
Attending: EMERGENCY MEDICINE

## 2024-05-24 VITALS
RESPIRATION RATE: 20 BRPM | DIASTOLIC BLOOD PRESSURE: 83 MMHG | SYSTOLIC BLOOD PRESSURE: 154 MMHG | HEART RATE: 64 BPM | TEMPERATURE: 98 F | OXYGEN SATURATION: 99 %

## 2024-05-24 DIAGNOSIS — J01.90 ACUTE SINUSITIS, RECURRENCE NOT SPECIFIED, UNSPECIFIED LOCATION: Primary | ICD-10-CM

## 2024-05-24 PROCEDURE — 99213 OFFICE O/P EST LOW 20 MIN: CPT

## 2024-05-24 RX ORDER — LEVOFLOXACIN 500 MG/1
500 TABLET, FILM COATED ORAL DAILY
Qty: 7 TABLET | Refills: 0 | Status: SHIPPED | OUTPATIENT
Start: 2024-05-24 | End: 2024-05-31

## 2024-05-24 NOTE — ED PROVIDER NOTES
Patient Seen in: [facility] [place]      History     Chief Complaint   Patient presents with    Sinus Problem     Stated Complaint: Sinus ;Infection    Subjective:   HPI    78-year-old female presents for evaluation of congestion.  Patient reports for the past 2 weeks she has had rhinorrhea, congestion, sinus pain.  States low-grade fevers earlier last week, nothing since.  No chest pain or shortness of breath, no vomiting.    Objective:   Past Medical History:    Anxiety state    Breast cancer (HCC)    Left breast IDC DCIS    Breast injury    left breast     Esophageal reflux    Essential hypertension    GERD    Osteoarthritis, knee    PONV (postoperative nausea and vomiting)    SEASONAL ALLERGIES    Visual impairment    wears glasses/contact              Past Surgical History:   Procedure Laterality Date    Cholecystectomy          Colonoscopy       panendoscopy normal     Egd N/A 2/10/2017    Procedure: ESOPHAGOGASTRODUODENOSCOPY, COLONOSCOPY, POSSIBLE BIOPSY, POSSIBLE POLYPECTOMY 46471, [id number];  Surgeon: Scott Reid MD;  Location: [facility]    Laparoscopic  7/15/09    Performed by FRANCOIS LEE at [facility]    Mastectomy left  2019    DCIS    Needle biopsy left  2016    fibrocystic changes stromal fibrosis apocrine cyst    Needle biopsy right  2019    intraductal papilloma usual duct hyperplasia          1 no dm    Other surgical history      urethra polyp 1970    Other surgical history      colposcopy ascus 2006 fibroids dx    Other surgical history  2019    left breast mastectomy    Tonsillectomy                  No pertinent social history.            Review of Systems    Positive for stated complaint: Sinus ;Infection  Other systems are as noted in HPI.  Constitutional and vital signs reviewed.      All other systems reviewed and negative except as noted above.    Physical Exam     ED Triage Vitals [24 1205]   /83   Pulse 64   Resp  20   Temp 97.5 °F (36.4 °C)   Temp src Temporal   SpO2 99 %   O2 Device None (Room air)       Current Vitals:   Vital Signs  BP: 154/83  Pulse: 64  Resp: 20  Temp: 97.5 °F (36.4 °C)  Temp src: Temporal    Oxygen Therapy  SpO2: 99 %  O2 Device: None (Room air)            Physical Exam  Vitals and nursing note reviewed.   Constitutional:       General: She is not in acute distress.     Appearance: Normal appearance. She is not ill-appearing or toxic-appearing.   HENT:      Head: Normocephalic and atraumatic.      Mouth/Throat:      Pharynx: No oropharyngeal exudate or posterior oropharyngeal erythema.   Eyes:      Conjunctiva/sclera: Conjunctivae normal.   Cardiovascular:      Rate and Rhythm: Normal rate and regular rhythm.   Pulmonary:      Effort: Pulmonary effort is normal. No respiratory distress.      Breath sounds: Normal breath sounds.   Musculoskeletal:         General: Normal range of motion.      Cervical back: Normal range of motion. No rigidity.   Neurological:      General: No focal deficit present.      Mental Status: She is alert.   Psychiatric:         Mood and Affect: Mood normal.               ED Course   Labs Reviewed - No data to display                   MDM                                        Medical Decision Making  Differential diagnosis includes but is not limited to sinusitis, upper respiratory infection, pneumonia    Well-appearing patient, sinusitis symptoms for longer than week, will treat with a course of antibiotics.  Discussed recommendation for ENT follow-up if not improving after finishing antibiotics.  Patient verbalizes understanding of and agreement with this plan.    Problems Addressed:  Acute sinusitis, recurrence not specified, unspecified location: acute illness or injury    Risk  Prescription drug management.        Disposition and Plan     Clinical Impression:  1. Acute sinusitis, recurrence not specified, unspecified location         Disposition:  Discharge  5/24/2024  12:18 pm    Follow-up:  Janina Ricardo MD  [address]  [place] [address]  [phone]      As needed          Medications Prescribed:  Discharge Medication List as of 5/24/2024 12:22 PM        START taking these medications    Details   levoFLOXacin 500 MG Oral Tab Take 1 tablet (500 mg total) by mouth daily for 7 days., Normal, Disp-7 tablet, R-0

## 2024-05-24 NOTE — ED INITIAL ASSESSMENT (HPI)
Patient reports 2 week hx of sinus congestion, sinus pressure, low grade fevers and cough.   Was seen on 5/12 negative covid at that time.

## 2024-05-24 NOTE — DISCHARGE INSTRUCTIONS
Take the antibiotic prescribed to you today as directed.  Make sure to finish the entire course even if you start to feel better.    See ENT if not feeling better in 1 week.

## 2024-08-08 ENCOUNTER — HOSPITAL ENCOUNTER (OUTPATIENT)
Age: 79
Discharge: HOME | End: 2024-08-08
Payer: MEDICARE

## 2024-08-08 VITALS
SYSTOLIC BLOOD PRESSURE: 136 MMHG | RESPIRATION RATE: 16 BRPM | HEART RATE: 73 BPM | TEMPERATURE: 98 F | DIASTOLIC BLOOD PRESSURE: 74 MMHG | OXYGEN SATURATION: 97 %
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
## 2024-08-08 PROCEDURE — 99213 OFFICE O/P EST LOW 20 MIN: CPT

## 2024-08-08 RX ORDER — CLOBETASOL PROPIONATE 0.5 MG/G
1 OINTMENT TOPICAL 2 TIMES DAILY
Qty: 15 G | Refills: 0 | Status: SHIPPED | OUTPATIENT
Start: 2024-08-08 | End: 2024-08-15

## 2024-08-08 NOTE — DISCHARGE INSTRUCTIONS
APPLY TO AFFECTED AREAS MARKED. READDRESS IN 2-3 DAYS IF WORSENING CHANGES INCLUDING FEVER as ADVISED.

## 2024-08-08 NOTE — ED PROVIDER NOTES
Chief Complaint   Patient presents with    Insect Bite       HPI:     Tanna Steele is a 78 year old female who presents for evaluation of rash along the chest and upper back onset overnight, notes was gardening yesterday morning without specific insect bite visualized.  Notes associated itch with any topical or oral agents.  Denies any sick contacts or exposures or previous infestation.  Denies associated fevers chills headache dizziness cough vomiting or diarrhea.      PFSH    PFSH asessment screens reviewed and agree.  Nurses notes reviewed I agree with documentation.    Family History   Problem Relation Age of Onset    Other (Other) Father          staph infection after fall    Diabetes Mother     Other (Other) Mother     Other (Other) Other         muscular dystrophy 2  genetic    Heart Disease Sister 65    Colon Cancer Maternal Grandmother         colon cancer    Cancer Maternal Grandmother         colon    Breast Cancer Neg     Thyroid disease Neg      Family history reviewed with patient/caregiver and is not pertinent to presenting problem.  Social History     Socioeconomic History    Marital status:      Spouse name: Not on file    Number of children: Not on file    Years of education: Not on file    Highest education level: Not on file   Occupational History    Not on file   Tobacco Use    Smoking status: Never    Smokeless tobacco: Never    Tobacco comments:     caffeine 2    Vaping Use    Vaping status: Never Used   Substance and Sexual Activity    Alcohol use: No     Alcohol/week: 0.0 standard drinks of alcohol     Comment: socially    Drug use: No    Sexual activity: Not Currently   Other Topics Concern    Not on file   Social History Narrative    Lives alone.  Retired.  Son in the area.  Good social network.  No grandchildren.     Social Determinants of Health     Financial Resource Strain: Not on file   Food Insecurity: Not on file   Transportation Needs: Not on file   Physical Activity:  Not on file   Stress: Not on file   Social Connections: Not on file   Housing Stability: Not on file         ROS:   Positive for stated complaint: Rash.  All other systems reviewed and negative except as noted above.  Constitutional and Vital Signs Reviewed.      Physical Exam:     Findings:    /74   Pulse 73   Temp 98.2 °F (36.8 °C) (Temporal)   Resp 16   SpO2 97%   GENERAL: well developed, well nourished, well hydrated, no distress  SKIN: good skin turgor, raised macular superficial wounds along the right upper chest, left upper back right lower back and neck.  All less than 2 cm.  Marked.  No induration fluctuance or warmth.  No pustules or vesicular pattern petechiae purpura or blanching.  NECK: supple, no adenopathy  EXTREMITIES: no cyanosis or edema. LUTZ without difficulty  GI: soft, non-tender, normal bowel sounds  HEAD: normocephalic, atraumatic  EYES: sclera non icteric bilateral, conjunctiva clear  LUNGS: clear to auscultation bilaterally; no rales, rhonchi, or wheezes  NEURO: No focal deficits  PSYCH: Alert and oriented x3.  Answering questions appropriately.  Mood appropriate.    MDM/Assessment/Plan:   Orders for this encounter:    Orders Placed This Encounter    clobetasol 0.05 % External Ointment     Sig: Apply 1 Application topically 2 (two) times daily for 7 days.     Dispense:  15 g     Refill:  0       Labs performed this visit:  No results found for this or any previous visit (from the past 10 hour(s)).    MDM:  Patient agrees with topical corticosteroids without oral corticosteroid or antibiotic at this time based on examination yet agrees to readdress outpatient as needed.  Happy with plan of care.  Alert nontoxic.    Diagnosis:    ICD-10-CM    1. Insect bite, unspecified site, initial encounter  W57.XXXA           All results reviewed and discussed with patient.  See AVS for detailed discharge instructions for your condition today.    Follow Up with:  Mile Conway  [address]  [address]  [facility] [id number] [phone]    Schedule an appointment as soon as possible for a visit in 3 days  As needed, For wound re-check, If symptoms worsen

## 2024-08-08 NOTE — ED INITIAL ASSESSMENT (HPI)
Patient arrived ambulatory to room c/o insect bites to bilateral upper extremities. Patient states she was in her garden yesterday.

## 2024-08-09 ENCOUNTER — HOSPITAL ENCOUNTER (OUTPATIENT)
Age: 79
Discharge: HOME | End: 2024-08-09
Attending: EMERGENCY MEDICINE
Payer: MEDICARE

## 2024-08-09 VITALS
RESPIRATION RATE: 20 BRPM | OXYGEN SATURATION: 98 % | DIASTOLIC BLOOD PRESSURE: 68 MMHG | HEART RATE: 79 BPM | TEMPERATURE: 97 F | SYSTOLIC BLOOD PRESSURE: 126 MMHG
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
## 2024-08-09 PROCEDURE — 99212 OFFICE O/P EST SF 10 MIN: CPT

## 2024-08-09 PROCEDURE — 99213 OFFICE O/P EST LOW 20 MIN: CPT

## 2024-08-09 NOTE — DISCHARGE INSTRUCTIONS
Continue steroid ointment as prescribed  Can  add antihistamine oral over the counter for itching

## 2024-08-09 NOTE — ED INITIAL ASSESSMENT (HPI)
Patient seen in the ic yesterday for evaluation of bug bites.  Prescribed clobetasol.  States some bites have increased in size overnight.  States she has tolerated clindamycin and cefpodoxime in the past .

## 2024-08-11 NOTE — ED PROVIDER NOTES
Patient Seen in: [facility]      History     Chief Complaint   Patient presents with    Bite Sting,Insect     Stated Complaint: skin infection seen yesterday    Subjective:   HPI    77 yo female seen in ic for bug bites and prescribed steroid. Some worsening overnight. Bite areas are itchy.     Objective:   Past Medical History:    Anxiety state    Breast cancer (HCC)    Left breast IDC DCIS    Breast injury    left breast     Esophageal reflux    Essential hypertension    GERD    Osteoarthritis, knee    PONV (postoperative nausea and vomiting)    SEASONAL ALLERGIES    Visual impairment    wears glasses/contact              No pertinent past surgical history.              No pertinent social history.            Review of Systems    Positive for stated Chief Complaint: Bite Sting,Insect    Other systems are as noted in HPI.  Constitutional and vital signs reviewed.      All other systems reviewed and negative except as noted above.    Physical Exam     ED Triage Vitals [08/09/24 0839]   BP (!) 168/88   Pulse 79   Resp 20   Temp 97.2 °F (36.2 °C)   Temp src Temporal   SpO2 98 %   O2 Device None (Room air)       Current Vitals:   No data recorded        Physical Exam  Vitals and nursing note reviewed.   Constitutional:       Appearance: Normal appearance. She is well-developed.   HENT:      Head: Normocephalic and atraumatic.   Cardiovascular:      Rate and Rhythm: Normal rate and regular rhythm.   Pulmonary:      Effort: Pulmonary effort is normal. No respiratory distress.   Skin:     General: Skin is warm and dry.      Capillary Refill: Capillary refill takes less than 2 seconds.      Comments: Papule with surrounding erythema are present. No vesicle. Not cellulitic. No abscess.    Neurological:      General: No focal deficit present.      Mental Status: She is alert.      Sensory: No sensory deficit.   Psychiatric:         Mood and Affect: Mood normal.         Behavior: Behavior normal.              ED  Course   Labs Reviewed - No data to display                   MDM                                      Medical Decision Making  Inflammatory reaction vs cellulitis. Exam findings consistent with inflammatory reaction to insect bite. Recommend continue steroid as prescribed and add antihistamine.     Disposition and Plan     Clinical Impression:  1. Insect bite, unspecified site, initial encounter         Disposition:  Discharge  8/9/2024  8:45 am    Follow-up:  Mile Conway MD  [address] [facility]  [address] [place] [address]  [phone]      As needed          Medications Prescribed:  Discharge Medication List as of 8/9/2024  8:49 AM

## 2024-09-25 ENCOUNTER — HOSPITAL ENCOUNTER (OUTPATIENT)
Age: 79
Discharge: HOME | End: 2024-09-25
Payer: MEDICARE

## 2024-09-25 VITALS
SYSTOLIC BLOOD PRESSURE: 132 MMHG | HEIGHT: 63 IN | BODY MASS INDEX: 31.89 KG/M2 | HEART RATE: 99 BPM | TEMPERATURE: 97 F | DIASTOLIC BLOOD PRESSURE: 79 MMHG | WEIGHT: 180 LBS | RESPIRATION RATE: 18 BRPM | OXYGEN SATURATION: 95 %

## 2024-09-25 DIAGNOSIS — B34.9 VIRAL SYNDROME: Primary | ICD-10-CM

## 2024-09-25 LAB — SARS-COV-2 RNA RESP QL NAA+PROBE: NOT DETECTED

## 2024-09-25 PROCEDURE — 99213 OFFICE O/P EST LOW 20 MIN: CPT

## 2024-09-25 PROCEDURE — 99212 OFFICE O/P EST SF 10 MIN: CPT

## 2024-09-25 NOTE — ED INITIAL ASSESSMENT (HPI)
Pt presents to the IC wanting a covid test. Pt thinks she may have tested positive on a home test after experiencing fatigue and a fever of 101 today.

## 2024-09-25 NOTE — DISCHARGE INSTRUCTIONS
Push fluids.  Tylenol as needed for pain or fever.  Follow-up as needed with your primary care doctor.  Return for any concerns.

## 2024-09-25 NOTE — ED PROVIDER NOTES
He    Patient Seen in: [facility]      History     Chief Complaint   Patient presents with    Covid     Stated Complaint: COVID testing  Subjective:   79-year-old female with a history of hypertension, GERD, and anxiety presents for a COVID test.  She states she had a tactile fever today.  She thought maybe it was about 100 degrees.  She states she had some fatigue and a mild dry cough.  No chest pain or difficulty breathing.  No sore throat or ear pain.  No known exposure to sick contacts.  She is eating and drinking without vomiting or diarrhea.  No neck stiffness.  No rashes.  No headaches.  No dizziness.  She appears nontoxic.  She is concerned because she took a home test at home which was positive.      Objective:   Past Medical History:    Anxiety state    Breast cancer (HCC)    Left breast IDC DCIS    Breast injury    left breast     Esophageal reflux    Essential hypertension    GERD    Osteoarthritis, knee    PONV (postoperative nausea and vomiting)    SEASONAL ALLERGIES    Visual impairment    wears glasses/contact            Past Surgical History:   Procedure Laterality Date    Cholecystectomy          Colonoscopy       panendoscopy normal     Egd N/A 2/10/2017    Procedure: ESOPHAGOGASTRODUODENOSCOPY, COLONOSCOPY, POSSIBLE BIOPSY, POSSIBLE POLYPECTOMY 20309, 80095;  Surgeon: Scott Reid MD;  Location: [facility]    Laparoscopic  7/15/09    Performed by FRANCOIS LEE at [facility]    Mastectomy left  2019    DCIS    Needle biopsy left  2016    fibrocystic changes stromal fibrosis apocrine cyst    Needle biopsy right  2019    intraductal papilloma usual duct hyperplasia          1 no dm    Other surgical history      urethra polyp 1970    Other surgical history      colposcopy ascus 2006 fibroids dx    Other surgical history  2019    left breast mastectomy    Tonsillectomy                Social History     Socioeconomic History     Marital status:    Tobacco Use    Smoking status: Never    Smokeless tobacco: Never    Tobacco comments:     caffeine 2    Vaping Use    Vaping status: Never Used   Substance and Sexual Activity    Alcohol use: No     Alcohol/week: 0.0 standard drinks of alcohol     Comment: socially    Drug use: No    Sexual activity: Not Currently   Social History Narrative    Lives alone.  Retired.  Son in the area.  Good social network.  No grandchildren.            Review of Systems    Positive for stated complaint: Covid     Other systems are as noted in HPI.  Constitutional and vital signs reviewed.      All other systems reviewed and negative except as noted above.    Physical Exam     ED Triage Vitals [09/25/24 1643]   /79   Pulse 99   Resp 18   Temp 97.4 °F (36.3 °C)   Temp src Temporal   SpO2 95 %   O2 Device None (Room air)     Current:/79   Pulse 99   Temp 97.4 °F (36.3 °C) (Temporal)   Resp 18   Ht 160 cm (5' 3\")   Wt 81.6 kg   SpO2 95%   BMI 31.89 kg/m²     Physical Exam  Vitals and nursing note reviewed.   Constitutional:       General: She is not in acute distress.     Appearance: Normal appearance. She is not toxic-appearing.   HENT:      Head: Normocephalic.      Right Ear: Tympanic membrane normal.      Left Ear: Tympanic membrane normal.      Nose: Nose normal.      Mouth/Throat:      Mouth: Mucous membranes are moist.      Pharynx: Oropharynx is clear. No oropharyngeal exudate or posterior oropharyngeal erythema.   Eyes:      Extraocular Movements: Extraocular movements intact.      Conjunctiva/sclera: Conjunctivae normal.      Pupils: Pupils are equal, round, and reactive to light.   Cardiovascular:      Rate and Rhythm: Normal rate and regular rhythm.   Pulmonary:      Effort: Pulmonary effort is normal.      Breath sounds: Normal breath sounds. No wheezing, rhonchi or rales.   Musculoskeletal:         General: Normal range of motion.      Cervical back: Normal range of motion and  neck supple.   Lymphadenopathy:      Cervical: No cervical adenopathy.   Skin:     General: Skin is warm and dry.      Capillary Refill: Capillary refill takes less than 2 seconds.      Findings: No rash.   Neurological:      General: No focal deficit present.      Mental Status: She is alert and oriented to person, place, and time.   Psychiatric:         Mood and Affect: Mood normal.         Behavior: Behavior normal.         ED Course   No results found.  Labs Reviewed   RAPID SARS-COV-2 BY PCR - Normal       MDM     Medical Decision Making  The COVID test is negative.  The patient is aware.  We discussed that her symptoms may still be viral.  We discussed port of care including pushing fluids, rest, and Tylenol as needed for pain or fever.  She will follow-up as needed with her primary care doctor.    Amount and/or Complexity of Data Reviewed  Labs: ordered.     Details: COVID-negative    Risk  OTC drugs.  Risk Details: COVID versus viral syndrome        Disposition and Plan     Clinical Impression:  1. Viral syndrome         Disposition:  Discharge  9/25/2024  4:59 pm    Follow-up:  Mile Conway MD  [facility] [address] [address] [phone]    Schedule an appointment as soon as possible for a visit   As needed          Medications Prescribed:  Current Discharge Medication List

## 2025-07-25 ENCOUNTER — HOSPITAL ENCOUNTER (OUTPATIENT)
Age: 80
Discharge: HOME | End: 2025-07-25

## 2025-07-25 VITALS
RESPIRATION RATE: 16 BRPM | OXYGEN SATURATION: 98 % | HEART RATE: 77 BPM | TEMPERATURE: 98 F | SYSTOLIC BLOOD PRESSURE: 150 MMHG | DIASTOLIC BLOOD PRESSURE: 76 MMHG

## 2025-07-25 DIAGNOSIS — R30.0 DYSURIA: Primary | ICD-10-CM

## 2025-07-25 DIAGNOSIS — K59.00 CONSTIPATION, UNSPECIFIED CONSTIPATION TYPE: ICD-10-CM

## 2025-07-25 LAB
BILIRUB UR QL STRIP: NEGATIVE
CLARITY UR: CLEAR
COLOR UR: YELLOW
GLUCOSE UR STRIP-MCNC: NEGATIVE MG/DL
HGB UR QL STRIP: NEGATIVE
KETONES UR STRIP-MCNC: NEGATIVE MG/DL
LEUKOCYTE ESTERASE UR QL STRIP: NEGATIVE
NITRITE UR QL STRIP: NEGATIVE
PH UR STRIP: 6
PROT UR STRIP-MCNC: NEGATIVE MG/DL
SP GR UR STRIP: <=1.005
UROBILINOGEN UR STRIP-ACNC: <2 MG/DL

## 2025-07-25 PROCEDURE — 81002 URINALYSIS NONAUTO W/O SCOPE: CPT

## 2025-07-25 PROCEDURE — 99213 OFFICE O/P EST LOW 20 MIN: CPT

## 2025-07-25 PROCEDURE — 87086 URINE CULTURE/COLONY COUNT: CPT

## 2025-07-25 PROCEDURE — 99212 OFFICE O/P EST SF 10 MIN: CPT

## 2025-07-25 NOTE — DISCHARGE INSTRUCTIONS
Your urine sample today is without any abnormalities.  Urine culture will be sent. Drink plenty of water   - Follow up with your doctor as needed   - Seek medical attention sooner for worsening of symptoms despite treatment efforts, or the emergency room for the following non inclusive list of symptoms: uncontrolled fever/pain, inability to keep fluids down, changes in mentation and level of consciousness, flank pain, increasing blood in urine, urinary incontinence or inability to urinate.         Constipation care measures   - Start taking Miralax 1-2x daily for the next couple days. If you do not have additional bowel movements and your symptoms do not improve, take Magnesium Citrate (liquid bottle, over-the-counter). You will likely have multiple bowel movements after this so do not go far away from a bathroom. (Do not use Miralax chronically)   - Increase water intake   - Avoid fatty, fried, greasy, \"fast\" or \"dense\" foods as these foods take longer to digest   - Avoid dairy (can be inflammatory to the gut)   - Avoid \"super processed\" foods (e.g. chips, margarine, soda / sweetened drinks, frozen meals, some breads)   - Add green-leafy foods & vegetables to your diet to add roughage and help with gut mobility    - Take Probiotics daily, year round   - Use Gas-X as needed

## 2025-07-25 NOTE — ED PROVIDER NOTES
History     Chief Complaint   Patient presents with    Urinary Symptoms       Subjective:   HPI    Tanna Steele, 79 year old female with notable medical history of anxiety, breast cancer, GERD, hypertension, OA who presents with dysuria at the end of voiding for the last 4 to 5 days.  Denies any fevers or chills.  Reports suprapubic pressure and aching to the back.  Reports alternating loose stool and constipation.  Denies any nausea, emesis.  Reports history of cholecystectomy, normal colonoscopy, previous fibroids.  Denies any vaginal discharge or itching. Denies any night sweats or weight loss.       Problem List[1]   Objective:   Past Medical History:    Anxiety state    Breast cancer (HCC)    Left breast IDC DCIS    Breast injury    left breast     Esophageal reflux    Essential hypertension    GERD    Osteoarthritis, knee    PONV (postoperative nausea and vomiting)    SEASONAL ALLERGIES    Visual impairment    wears glasses/contact              Past Surgical History:   Procedure Laterality Date    Cholecystectomy          Colonoscopy       panendoscopy normal     Egd N/A 2/10/2017    Procedure: ESOPHAGOGASTRODUODENOSCOPY, COLONOSCOPY, POSSIBLE BIOPSY, POSSIBLE POLYPECTOMY 14973, 58186;  Surgeon: Scott Reid MD;  Location: [facility]    Laparoscopic  7/15/09    Performed by FRANCOIS LEE at [facility]    Mastectomy left  2019    DCIS    Needle biopsy left  2016    fibrocystic changes stromal fibrosis apocrine cyst    Needle biopsy right  2019    intraductal papilloma usual duct hyperplasia          1 no dm    Other surgical history      urethra polyp 1970    Other surgical history      colposcopy ascus 2006 fibroids dx    Other surgical history  2019    left breast mastectomy    Tonsillectomy                  Social History     Socioeconomic History    Marital status:    Tobacco Use    Smoking status: Never    Smokeless tobacco: Never     Tobacco comments:     caffeine 2    Vaping Use    Vaping status: Never Used   Substance and Sexual Activity    Alcohol use: No     Alcohol/week: 0.0 standard drinks of alcohol     Comment: socially    Drug use: No    Sexual activity: Not Currently   Social History Narrative    Lives alone.  Retired.  Son in the area.  Good social network.  No grandchildren.              Medications Ordered Prior to Encounter[2]      Constitutional and vital signs reviewed.      All other systems reviewed and negative except as noted above.    I have reviewed the family history, social history, allergies, and outpatient medications.     History reviewed from EMR: Encounters, problem list, allergies, medications      Physical Exam     ED Triage Vitals [07/25/25 1159]   /76   Pulse 77   Resp 16   Temp 98.3 °F (36.8 °C)   Temp src Oral   SpO2 98 %   O2 Device None (Room air)       Current:/76   Pulse 77   Temp 98.3 °F (36.8 °C) (Oral)   Resp 16   SpO2 98%       Physical Exam  Vitals and nursing note reviewed.   Constitutional:       General: She is not in acute distress.     Appearance: Normal appearance. She is not ill-appearing or toxic-appearing.   HENT:      Head: Normocephalic and atraumatic.   Eyes:      General:         Right eye: No discharge.         Left eye: No discharge.   Cardiovascular:      Rate and Rhythm: Normal rate and regular rhythm.      Pulses: Normal pulses.      Heart sounds: Normal heart sounds.   Pulmonary:      Effort: Pulmonary effort is normal.      Breath sounds: Normal breath sounds.   Abdominal:      General: Abdomen is flat. Bowel sounds are normal. There is no distension.      Palpations: There is no mass.      Tenderness: There is no abdominal tenderness. There is no right CVA tenderness, left CVA tenderness, guarding or rebound.      Hernia: No hernia is present.   Musculoskeletal:      Cervical back: Normal range of motion and neck supple.      Right lower leg: No edema.      Left  lower leg: No edema.   Skin:     General: Skin is warm and dry.      Capillary Refill: Capillary refill takes less than 2 seconds.      Coloration: Skin is not jaundiced or pale.   Neurological:      General: No focal deficit present.      Mental Status: She is alert and oriented to person, place, and time.      Gait: Gait normal.   Psychiatric:         Mood and Affect: Mood normal.         Behavior: Behavior normal.            ED Course     Labs Reviewed   [facility] POCT URINALYSIS DIPSTICK   URINE CULTURE, ROUTINE     No orders to display       Vitals:    07/25/25 1159   BP: 150/76   Pulse: 77   Resp: 16   Temp: 98.3 °F (36.8 °C)   TempSrc: Oral   SpO2: 98%            MDM        Tanna Steele, 79 year old female with medical history as noted above who presents with dysuria and constipation    - Differential diagnosis considered but not limited to UTI, atrophic vaginitis, interstitial cystitis, urethritis, constipation, vs other  - Urinalysis without any abnormalities. Given patient's symptoms and history of breast cancer/immunosuppression will send urine culture.  Discussed with patient treating prophylactically versus waiting for urine culture.  Patient will wait for urine culture result.  -Previous urine cultures reviewed.  Last UTI 1 year ago.  - Discussed imaging of lower abdomen for other etiologies.  Patient declined at this time.  States will follow-up with primary care doctor or return for further evaluation with new or worsening symptoms.  The patient is encouraged to return if any concerning symptoms arise. Additional verbal discharge instructions are given and discussed. Discharge medications are discussed. The patient is in good condition throughout the visit today and remains so upon discharge. I discuss the plan of care with the patient, who expresses understanding. All questions and concerns are addressed to the patient's satisfaction prior to discharge today. ED precautions discussed.      ** See ED  course below for additional information on care provided / interventions / notable events throughout patient's encounter.           ** I have independently reviewed the radiology images, clinical lab results, and ECG tracings as described above (if applicable)    ** Concerning co-morbidities possibly affecting complaint / care: breast cancer        Medical Decision Making  Amount and/or Complexity of Data Reviewed  External Data Reviewed: labs.  Labs: ordered. Decision-making details documented in ED Course.    Risk  OTC drugs.        Disposition and Plan     Disposition:  Discharge  7/25/2025 12:22 pm    Clinical Impression:  1. Dysuria    2. Constipation, unspecified constipation type            Follow-up:  Mile Conway MD  [address] [facility]  [address]  [place] [address]  [phone]              Medications Prescribed:  Discharge Medication List as of 7/25/2025 12:22 PM            SIA Lombardi, APRN, FNP-BC  Family Nurse Practitioner  [facility]      The above patient (and/or guardian) was made aware that an appropriate evaluation has been performed, and that no additional testing is required at this time. In my medical judgment, there is currently no evidence of an immediate life-threatening or surgical condition, therefore discharge is indicated at this time. The patient (and/or guardian) was advised that a small risk still exists that a serious condition could develop. The patient was instructed to arrange close follow-up with their primary care provider (or the referral provider given today). The patient received written and verbal instructions regarding their condition / concerns, demonstrated understanding, and is agreement with the outpatient treatment plan.            [1]   Patient Active Problem List  Diagnosis    GERD (gastroesophageal reflux disease)    Obesity    Environmental allergies    Rosacea    Mixed hyperlipidemia    Thrombosed external hemorrhoid    Elevated BP without  diagnosis of hypertension    GIBBONS (nonalcoholic steatohepatitis)    Thoracic aortic aneurysm without rupture    Malignant neoplasm of upper-outer quadrant of left breast in female, estrogen receptor positive (HCC)    Inflammatory breast cancer, left (HCC)    HER2-positive carcinoma of breast (HCC)    Fever    Sepsis, due to unspecified organism    Counseling regarding advance care planning and goals of care    Severe major depression, single episode, without psychotic features (HCC)    Adjustment disorder with anxiety    At risk for dehydration    Enterocolitis due to Clostridium difficile, not specified as recurrent    Essential (primary) hypertension    Unsteadiness on feet    Muscle weakness (generalized)   [2]   No current facility-administered medications on file prior to encounter.     Current Outpatient Medications on File Prior to Encounter   Medication Sig Dispense Refill    benzonatate 100 MG Oral Cap Take 1 capsule (100 mg total) by mouth 3 (three) times daily as needed for cough (May take up to TWO tabs. Do not exceed 6 tabs in 24 hours.). 30 capsule 0    metoprolol succinate 25 MG Oral Tablet 24 Hr Take 1 tablet (25 mg total) by mouth daily. 90 tablet 1    anastrozole 1 MG Oral Tab tab Take 1 tablet (1 mg total) by mouth daily. 30 tablet 11    Pantoprazole Sodium 40 MG Oral Tab EC Take 1 tablet (40 mg total) by mouth every morning before breakfast. 90 tablet 3    anastrozole 1 MG Oral Tab tab Take 1 tablet (1 mg total) by mouth daily. 30 tablet 11    anastrozole 1 MG Oral Tab tab Take 1 tablet (1 mg total) by mouth daily. 90 tablet 0    Metoprolol Succinate ER 25 MG Oral Tablet 24 Hr Take 1 tablet (25 mg total) by mouth daily. 90 tablet 3    Metoprolol Succinate ER 25 MG Oral Tablet 24 Hr Take 1 tablet (25 mg total) by mouth nightly. 90 tablet 1    CALCIUM-VITAMIN D3 500-400 MG-UNIT Oral Tab TAKE 2 TABLETS BY MOUTH EVERY  tablet 1    acetaminophen 325 MG Oral Tab Take 2 tablets (650 mg total) by  mouth every 4 (four) hours as needed. (Patient not taking: No sig reported) 30 tablet 0    Saccharomyces boulardii (FLORASTOR OR) daily.      Multiple Vitamins-Minerals (MULTIVITAL) Oral Tab Take 1 tablet by mouth daily.

## (undated) DEVICE — DRAIN RESERVOIR RELIAVAC 100CC

## (undated) DEVICE — SUTURE SILK 2-0 FS

## (undated) DEVICE — VIOLET BRAIDED (POLYGLACTIN 910), SYNTHETIC ABSORBABLE SUTURE: Brand: COATED VICRYL

## (undated) DEVICE — Device

## (undated) DEVICE — DRAIN SILICONE FLAT 10MM

## (undated) DEVICE — ENCORE® LATEX MICRO SIZE 8, STERILE LATEX POWDER-FREE SURGICAL GLOVE: Brand: ENCORE

## (undated) DEVICE — IMPERVIOUS STOCKINETTE: Brand: DEROYAL

## (undated) DEVICE — SOL  .9 1000ML BTL

## (undated) DEVICE — ABDOMINAL PAD: Brand: CURITY

## (undated) DEVICE — COVER PRB NEOGUARD 30X2.6CM US

## (undated) DEVICE — CAUTERY BLADE 2IN INS E1455

## (undated) DEVICE — ENCORE® LATEX ACCLAIM SIZE 8, STERILE LATEX POWDER-FREE SURGICAL GLOVE: Brand: ENCORE

## (undated) DEVICE — UNDYED BRAIDED (POLYGLACTIN 910), SYNTHETIC ABSORBABLE SUTURE: Brand: COATED VICRYL

## (undated) DEVICE — ADHESIVE MASTISOL 2/3CC VL

## (undated) DEVICE — 3M™ STERI-STRIP™ REINFORCED ADHESIVE SKIN CLOSURES, R1547, 1/2 IN X 4 IN (12 MM X 100 MM), 6 STRIPS/ENVELOPE: Brand: 3M™ STERI-STRIP™

## (undated) DEVICE — SUPER SPONGES,MEDIUM: Brand: KERLIX

## (undated) DEVICE — SUTURE PLAIN GUT 3-0 CT-1

## (undated) DEVICE — 3M™ TEGADERM™ TRANSPARENT FILM DRESSING, 1626W, 4 IN X 4-3/4 IN (10 CM X 12 CM), 50 EACH/CARTON, 4 CARTON/CASE: Brand: 3M™ TEGADERM™

## (undated) DEVICE — SUTURE CHROMIC GUT 3-0 SH

## (undated) DEVICE — CURVED, SMALL JAW, OPEN SEALER/DIVIDER: Brand: LIGASURE

## (undated) DEVICE — MAJOR GENERAL: Brand: MEDLINE INDUSTRIES, INC.

## (undated) DEVICE — CLIP MED INTNL HMCLP TNTLM

## (undated) NOTE — ED AVS SNAPSHOT
[facility] in [facility] [address] [place] [address] Jose Enrique Hawkins    Phone:  [phone]    Fax:  [phone]           Gentry Rms   MRN: [id number]    Department:  [facility] in [place]   Date of Visit:  4/ under your health insurance plan. Please contact your insurance company and physician's office to determine coverage and benefits available for follow-up care and referrals.      It is our goal to assure that you are completely satisfied with every aspect doctor until you can check with your doctor. Please bring the medication list to your next doctor's appointment. Any imaging studies and labs completed today can be reviewed in your [employer] account.   You may have had testing done that requires us to co Medicaid plans. To get signed up and covered, please call [phone] and ask to get set up for an insurance coverage that is in-network with [facility].         MyChart     Visit [employer]  You can access your MyChart to more actively manage

## (undated) NOTE — IP AVS SNAPSHOT
Patient Demographics     Address  [address] Phone  [phone] [facility])  [phone] (Mobile) *Preferred* E-mail Address  [email or web address]      Emergency Contact(s)     Name Relation Home Work [employer] S vancomycin HCl 50 MG/ML Solr  Commonly known as:  FIRVANQ  Take 2.5 mL (125 mg total) by mouth 2 (two) times daily for 14 days, THEN 2.5 mL (125 mg total) daily for 14 days.   Start taking on:  7/11/2019        CHANGE how you take these medications    Margaret Moncada Adam Mantilla MD. Schedule an appointment as soon as possible for a visit in 1 week.     Specialty:  ONCOLOGY  Contact information:  [address], [address] Take 1 tablet (0.5 mg total) by mouth 2 (two) times daily as needed for Anxiety. Margaret Li MD         Metoprolol Succinate ER 25 MG Tb24  Commonly known as: Toprol XL  Next dose due:  TONIGHT      Take 1 tablet (25 mg total) by mouth once daily.    Anit [id number] Pantoprazole Sodium (PROTONIX) EC tab 40 mg 07/11/19 1927 Given      [id number] Pantoprazole Sodium (PROTONIX) EC tab 40 mg 07/12/19 0617 Given      [id number] aspirin EC EC tab 325 mg 07/11/19 1927 Given      [id number] aspirin EC EC tab 325 mg 07 Most Recent Value   Mode  Spontaneous/Timed   Interface  Face mask   Mask size  Medium   Set rate  12 breaths/min   Set IPAP  12   Set EPAP  6   O2%  100 %         Lab Results Last 24 Hours      RAINBOW DRAW LAVENDER [[id number]]  Resulted: 07/12/19 0903, TROPONIN I [[id number]] (Abnormal)  Resulted: 07/12/19 0753, Result status: Final result   Ordering provider:  Ayleen Fishman MD  07/12/19 0227 Resulting lab:  [facility]    Specimen Information    Type Source Collected On   Blood — 07/12/19 2297 Borderline  130-159 mg/dL   High        160-189 mg/dL       Very high >=190 mg/dL                TROPONIN I [[id number]] (Abnormal)  Resulted: 07/11/19 1636, Result status: Final result   Ordering provider:  Elmyra Nageotte, MD  07/11/19 1526 Resulting lab:  [facility] MRSA Screen by PCR Once [[id number]] Collected:  06/29/19 0915    Order Status:  Sent Lab Status:  No result     Specimen:  Other from [facility]     Respiratory Panel FLU expanded Once [[id number]]  (Normal) Collected:  06/27/19 1543    Order Status:  Completed The GI Panel tests for Campylobacter species jejuni, coli, and   upsaliensis; all species, subspecies, and serovars of Salmonella;   and Vibrio species parahaemolyticus, vulnificus, and cholera. It does NOT test for Aeromonas or Edwardsiella species. Performed by Sarah Carlson MD at [facility] N/A 7/15/2009    Performed by Jasmine Billings MD at [facility]   • NEEDLE BIOPSY LEFT     •       1 no dm   • OTHER SURGICAL HI • Other (Other) Mother    • Other (Other) Other         muscular dystrophy 2  genetic   • Heart Disease Sister 72   • Colon Cancer Maternal Grandmother         colon cancer   • Cancer Maternal Grandmother         colon   • Breast Cancer Neg    • Thyroid di inspiration. 3. Cardiomegaly. 4. Right Port-A-Cath tip in SVC.     Dictated by (CST): Natalie Nair MD on 6/26/2019 at 20:10     Approved by (CST): Natalie Nair MD on 6/26/2019 at 20:14              ASSESSMENT / PLAN:   Patient is a 68year old female wi Author:  Aldo Oilva MD Service:  Cardiology Author Type:  Physician    Filed:  7/12/2019  7:29 AM Status:  Signed    :  Aldo Oliva MD (Physician)       [facility]    PATIENT'S NAME: Georgina Olvera   ATTENDING PHYSICIAN: Julien YIP PAST MEDICAL HISTORY:  Thoracic aortic aneurysm, hypertension, breast cancer, reflux, osteoarthritis, cholecystectomy, pneumonia, urethral polyp, tonsillectomy, recent depression, recent sepsis, C. difficile diarrhea.     SOCIAL HISTORY:  She is a nonsmoker 2.   Minimally elevated troponin of unclear significance. Her EKG is also abnormal with nonspecific changes. 3.   History of hypertension, currently controlled. 4.   Edema, likely fluid overload given her recent issues.   5.   Clostridium difficile diarr Counseling regarding advance care planning and goals of care      PHYSICAL THERAPY ASSESSMENT     Patient received sitting in bedside chair; agreeable to participate in therapy on this date.  Patient's primary complaint this date is fatigue with activity Weight Bearing Restriction: None                PAIN ASSESSMENT   Ratin     Management Techniques: (denies pain; discomfort from flexiseal)    BALANCE Current Status NT- pt up in chair upon arrival   Goal #2 Patient is able to demonstrate transfers Sit to/from Stand at assistance level: supervision with walker - rolling      Goal #2  Current Status Min assist    Goal #3 Patient is able to ambulate 75 fee OCCUPATIONAL THERAPY TREATMENT NOTE - INPATIENT        Room Number: 976/528-P           Presenting Problem: sepsis, PNA    Problem List  Principal Problem:    Sepsis, due to unspecified organism [facility])  Active Problems:    Fever    [facility] fever ([facility] Management Techniques: Repositioning     ACTIVITY TOLERANCE                         O2 SATURATIONS                ACTIVITIES OF DAILY LIVING ASSESSMENT  AM-PAC ‘6-Clicks’ Inpatient Daily Activity Short Form  How much help from another person does the patie adls with min a   Pt tolerated standing 4 min x 2 with seated rest break    patient will tolerate sitting edge of bed 10 min with least of amount of breathing support sba to improve endurance for ADL tasks   pt was up in chair this date            Goals ex

## (undated) NOTE — IP AVS SNAPSHOT
[facility]            (For Outpatient Use Only) Initial Admit Date: 6/26/2019   Inpt/Obs Admit Date: Inpt: 6/26/19 / Obs: N/A   Discharge Date:    Haydee Krishna:  [de-identified]   MRN: [de-identified]   CSN: [id number]   CEID: [id number]        U Subscriber ID:  Pt Rel to Subscriber:    Hospital Account Financial Class: Medicare    July 12, 2019

## (undated) NOTE — LETTER
[address] [facility] Harjit, BRADEN Chambers     AUTHORIZATION FOR SURGICAL OPERATION OR PROCEDURE    I hereby authorize                      , my Physician(s) and whomever may be designated as the doctor's Assistant, to perform the follo 4. I consent to the photographing of procedure(s) to be performed for the purposes of advancing medicine, science and/or education, provided my identity is not revealed.  If the procedure has been videotaped, the physician/surgeon will obtain the original v (Witness signature)                                                                                                  (Date)                                (Time)  STATEMENT OF PHYSICIAN My signature below affirms that prior to the time of the procedure;  I

## (undated) NOTE — ED AVS SNAPSHOT
[facility] in [address] [place] [address] Jose Enrique Hawkins    Phone:  [phone]    Fax:  [phone]           Cristóbal Ramirez   MRN: [id number]    Department:  [facility] in [address] [place] [address]   Date of Visit:  4/ physician may seek payment directly from you for amounts other than your deductible, co-payment, or co-insurance and for other services not covered under your health insurance plan.   Please contact your insurance company and physician's office to determine different from what your Primary Care doctor has instructed you - please continue to take your medications as instructed by your Primary Care doctor until you can check with your doctor. Please bring the medication list to your next doctor's appointment. coverage. Patient [address] is a Federal Navigator program that can help with your Affordable Care Act coverage, as well as all types of Medicaid plans.   To get signed up and covered, please call [phone] and ask to get set up for an insuran